# Patient Record
Sex: MALE | Race: WHITE | NOT HISPANIC OR LATINO | URBAN - METROPOLITAN AREA
[De-identification: names, ages, dates, MRNs, and addresses within clinical notes are randomized per-mention and may not be internally consistent; named-entity substitution may affect disease eponyms.]

---

## 2018-03-06 ENCOUNTER — TELEPHONE (OUTPATIENT)
Dept: PRIMARY CARE | Facility: CLINIC | Age: 48
End: 2018-03-06

## 2018-03-06 RX ORDER — AZITHROMYCIN 250 MG/1
TABLET, FILM COATED ORAL
Qty: 6 TABLET | Refills: 0 | Status: SHIPPED | OUTPATIENT
Start: 2018-03-06 | End: 2019-02-13 | Stop reason: ALTCHOICE

## 2018-03-29 ENCOUNTER — TELEPHONE (OUTPATIENT)
Dept: PRIMARY CARE | Facility: CLINIC | Age: 48
End: 2018-03-29

## 2018-03-29 RX ORDER — METHYLPREDNISOLONE 4 MG/1
TABLET ORAL
Qty: 21 TABLET | Refills: 0 | Status: SHIPPED | OUTPATIENT
Start: 2018-03-29 | End: 2018-11-27 | Stop reason: SDUPTHER

## 2018-09-27 ENCOUNTER — TELEPHONE (OUTPATIENT)
Dept: PRIMARY CARE | Facility: CLINIC | Age: 48
End: 2018-09-27

## 2018-09-27 RX ORDER — METHYLPREDNISOLONE 4 MG/1
TABLET ORAL
Qty: 21 TABLET | Refills: 0 | Status: SHIPPED | OUTPATIENT
Start: 2018-09-27 | End: 2019-01-28 | Stop reason: SDUPTHER

## 2018-10-31 ENCOUNTER — OFFICE VISIT (OUTPATIENT)
Dept: PRIMARY CARE | Facility: CLINIC | Age: 48
End: 2018-10-31
Payer: COMMERCIAL

## 2018-10-31 VITALS
HEART RATE: 96 BPM | OXYGEN SATURATION: 98 % | BODY MASS INDEX: 32.9 KG/M2 | DIASTOLIC BLOOD PRESSURE: 90 MMHG | WEIGHT: 235 LBS | HEIGHT: 71 IN | SYSTOLIC BLOOD PRESSURE: 132 MMHG | RESPIRATION RATE: 18 BRPM

## 2018-10-31 DIAGNOSIS — R25.2 CALF CRAMP: ICD-10-CM

## 2018-10-31 DIAGNOSIS — M1A.09X0 IDIOPATHIC CHRONIC GOUT OF MULTIPLE SITES WITHOUT TOPHUS: ICD-10-CM

## 2018-10-31 DIAGNOSIS — H10.33 ACUTE BACTERIAL CONJUNCTIVITIS OF BOTH EYES: Primary | ICD-10-CM

## 2018-10-31 PROCEDURE — 99214 OFFICE O/P EST MOD 30 MIN: CPT | Performed by: FAMILY MEDICINE

## 2018-10-31 ASSESSMENT — ENCOUNTER SYMPTOMS
EYE PAIN: 1
TROUBLE SWALLOWING: 0
NUMBNESS: 1
NECK PAIN: 0
WEAKNESS: 0
NERVOUS/ANXIOUS: 0
BLOOD IN STOOL: 0
HEADACHES: 0
FREQUENCY: 0
EYE DISCHARGE: 1
EYE REDNESS: 1
TREMORS: 1
EYE ITCHING: 1
NAUSEA: 0
SHORTNESS OF BREATH: 0
ARTHRALGIAS: 0
CHEST TIGHTNESS: 0
ABDOMINAL PAIN: 0
DYSURIA: 0
BACK PAIN: 0
FATIGUE: 0
ACTIVITY CHANGE: 0
DIARRHEA: 0
PALPITATIONS: 0

## 2018-10-31 NOTE — PROGRESS NOTES
Daily Progress Note      Subjective      Patient ID: Parvez Salguero is a 48 y.o. male.    HPI  Sudden onset b/l red, irritated, weepy eyes  Itching, painful  No vis affect, uri c/o  Prodrome negative  Also c/o intermittent, varying leg cramps, stiffness, spasms, nonspecific, random    The following have been reviewed and updated as appropriate in this visit:       Review of Systems   Constitutional: Negative for activity change and fatigue.   HENT: Negative for congestion, ear pain, tinnitus and trouble swallowing.    Eyes: Positive for pain, discharge, redness and itching.   Respiratory: Negative for chest tightness and shortness of breath.    Cardiovascular: Negative for chest pain and palpitations.   Gastrointestinal: Negative for abdominal pain, blood in stool, diarrhea and nausea.   Genitourinary: Negative for dysuria, frequency and urgency.   Musculoskeletal: Negative for arthralgias, back pain and neck pain.   Neurological: Positive for tremors and numbness. Negative for weakness and headaches.   Psychiatric/Behavioral: The patient is not nervous/anxious.    All other systems reviewed and are negative.      Current Outpatient Prescriptions   Medication Sig Dispense Refill   • gentamicin-prednisolone (PRED-G) 0.3-1 % ophthalmic drops Administer 1 drop into both eyes 2 (two) times a day for 10 days. 5 mL 0     No current facility-administered medications for this visit.      No past medical history on file.  No family history on file.  No past surgical history on file.  Social History     Social History   • Marital status:      Spouse name: N/A   • Number of children: N/A   • Years of education: N/A     Occupational History   • Not on file.     Social History Main Topics   • Smoking status: Not on file   • Smokeless tobacco: Not on file   • Alcohol use Not on file   • Drug use: Unknown   • Sexual activity: Not on file     Other Topics Concern   • Not on file     Social History Narrative   • No narrative  on file     Allergies   Allergen Reactions   • No Known Allergies        Objective   No new labs.    Physical Exam   Eyes: EOM are normal. Pupils are equal, round, and reactive to light. Right eye exhibits chemosis and discharge. Left eye exhibits chemosis and discharge. Right conjunctiva is injected. Left conjunctiva is injected.   Neurological: He has normal strength. No cranial nerve deficit or sensory deficit. He displays a negative Romberg sign.       Assessment/Plan     Problem List Items Addressed This Visit     Idiopathic chronic gout of multiple sites without tophus    Relevant Orders    Comprehensive metabolic panel    CBC and differential    TSH    Uric acid    Lipid panel      Other Visit Diagnoses     Acute bacterial conjunctivitis of both eyes    -  Primary    Relevant Medications    gentamicin-prednisolone (PRED-G) 0.3-1 % ophthalmic drops    Calf cramp        Relevant Orders    Comprehensive metabolic panel    CBC and differential    TSH    Uric acid    Lipid panel    Lyme EIA reflex WB        Orders Placed This Encounter   Procedures   • Comprehensive metabolic panel     Standing Status:   Future     Number of Occurrences:   1     Standing Expiration Date:   10/31/2019   • CBC and differential     Standing Status:   Future     Number of Occurrences:   1     Standing Expiration Date:   10/31/2019   • TSH     Standing Status:   Future     Number of Occurrences:   1     Standing Expiration Date:   10/31/2019   • Uric acid     Standing Status:   Future     Number of Occurrences:   1     Standing Expiration Date:   10/31/2019   • Lipid panel     Standing Status:   Future     Number of Occurrences:   1     Standing Expiration Date:   10/31/2019   • Lyme EIA reflex WB     Standing Status:   Future     Number of Occurrences:   1     Standing Expiration Date:   10/31/2019     Will trial eye drops x 2-3 d  Re-check if no change  Suspect ncs  Check labs   Discussed/counseled  Suspect ncs    Christos Amin,  DO  10/31/2018

## 2018-11-27 RX ORDER — METHYLPREDNISOLONE 4 MG/1
TABLET ORAL
Qty: 21 TABLET | Refills: 0 | Status: SHIPPED | OUTPATIENT
Start: 2018-11-27 | End: 2019-02-13 | Stop reason: ALTCHOICE

## 2019-01-28 RX ORDER — METHYLPREDNISOLONE 4 MG/1
TABLET ORAL
Qty: 21 TABLET | Refills: 0 | Status: SHIPPED | OUTPATIENT
Start: 2019-01-28 | End: 2019-02-13 | Stop reason: ALTCHOICE

## 2019-02-13 ENCOUNTER — OFFICE VISIT (OUTPATIENT)
Dept: PRIMARY CARE | Facility: CLINIC | Age: 49
End: 2019-02-13
Payer: COMMERCIAL

## 2019-02-13 VITALS
DIASTOLIC BLOOD PRESSURE: 88 MMHG | OXYGEN SATURATION: 98 % | HEART RATE: 92 BPM | HEIGHT: 71 IN | BODY MASS INDEX: 32.48 KG/M2 | RESPIRATION RATE: 18 BRPM | SYSTOLIC BLOOD PRESSURE: 124 MMHG | WEIGHT: 232 LBS

## 2019-02-13 DIAGNOSIS — J01.00 SUBACUTE MAXILLARY SINUSITIS: Primary | ICD-10-CM

## 2019-02-13 DIAGNOSIS — M10.471: ICD-10-CM

## 2019-02-13 PROCEDURE — 99213 OFFICE O/P EST LOW 20 MIN: CPT | Performed by: FAMILY MEDICINE

## 2019-02-13 RX ORDER — METHYLPREDNISOLONE 4 MG/1
TABLET ORAL
Qty: 21 TABLET | Refills: 0 | Status: SHIPPED | OUTPATIENT
Start: 2019-02-13 | End: 2019-03-19 | Stop reason: ALTCHOICE

## 2019-02-13 RX ORDER — IBUPROFEN 200 MG
200 TABLET ORAL EVERY 6 HOURS PRN
COMMUNITY
End: 2019-02-13 | Stop reason: ALTCHOICE

## 2019-02-13 RX ORDER — AZITHROMYCIN 250 MG/1
TABLET, FILM COATED ORAL
Qty: 6 TABLET | Refills: 0 | Status: SHIPPED | OUTPATIENT
Start: 2019-02-13 | End: 2019-03-19 | Stop reason: ALTCHOICE

## 2019-02-13 RX ORDER — INDOMETHACIN 25 MG/1
25 CAPSULE ORAL 2 TIMES DAILY WITH MEALS
COMMUNITY
End: 2019-02-13 | Stop reason: ALTCHOICE

## 2019-02-13 ASSESSMENT — ENCOUNTER SYMPTOMS
DIARRHEA: 0
ACTIVITY CHANGE: 0
FATIGUE: 0
NAUSEA: 0
NERVOUS/ANXIOUS: 0
WEAKNESS: 0
BLOOD IN STOOL: 0
HEADACHES: 0
SHORTNESS OF BREATH: 0
RHINORRHEA: 1
COUGH: 1
EYE PAIN: 0
CHEST TIGHTNESS: 0
ABDOMINAL PAIN: 0
DYSURIA: 0
SINUS PRESSURE: 1
PALPITATIONS: 0
NECK PAIN: 0
SINUS PAIN: 1
BACK PAIN: 0
ARTHRALGIAS: 1
FREQUENCY: 0
TROUBLE SWALLOWING: 0
SORE THROAT: 1

## 2019-02-14 NOTE — PROGRESS NOTES
Daily Progress Note      Subjective      Patient ID: Parvez Salguero is a 49 y.o. male.    HPI  Cough, fever, weakness, congestion, pnd, dsypnea  Ongoing x 5-7 d  No change otc  Fatigue, achey, tired    The following have been reviewed and updated as appropriate in this visit:       Review of Systems   Constitutional: Negative for activity change and fatigue.   HENT: Positive for congestion, ear pain, postnasal drip, rhinorrhea, sinus pain, sinus pressure and sore throat. Negative for tinnitus and trouble swallowing.    Eyes: Negative for pain.   Respiratory: Positive for cough. Negative for chest tightness and shortness of breath.    Cardiovascular: Negative for chest pain and palpitations.   Gastrointestinal: Negative for abdominal pain, blood in stool, diarrhea and nausea.   Genitourinary: Negative for dysuria, frequency and urgency.   Musculoskeletal: Positive for arthralgias and gait problem. Negative for back pain and neck pain.   Neurological: Negative for weakness and headaches.   Psychiatric/Behavioral: The patient is not nervous/anxious.    All other systems reviewed and are negative.      Current Outpatient Prescriptions   Medication Sig Dispense Refill   • azithromycin (ZITHROMAX Z-EVANGELINA) 250 mg tablet 500mg once for 1 day, then 250mg once daily for 4 days 6 tablet 0   • methylPREDNISolone (MEDROL DOSEPACK) 4 mg tablet Follow package directions. 21 tablet 0     No current facility-administered medications for this visit.      No past medical history on file.  No family history on file.  No past surgical history on file.  Social History     Social History   • Marital status:      Spouse name: N/A   • Number of children: N/A   • Years of education: N/A     Occupational History   • Not on file.     Social History Main Topics   • Smoking status: Not on file   • Smokeless tobacco: Not on file   • Alcohol use Not on file   • Drug use: Unknown   • Sexual activity: Not on file     Other Topics Concern   •  Not on file     Social History Narrative   • No narrative on file     Allergies   Allergen Reactions   • No Known Allergies        Objective   No new labs.    Physical Exam   HENT:   Right Ear: Tympanic membrane and ear canal normal.   Left Ear: Tympanic membrane and ear canal normal.   Nose: Mucosal edema present. Right sinus exhibits maxillary sinus tenderness and frontal sinus tenderness. Left sinus exhibits maxillary sinus tenderness and frontal sinus tenderness.   Mouth/Throat: Posterior oropharyngeal edema and posterior oropharyngeal erythema present.   Pulmonary/Chest: Effort normal and breath sounds normal.       Assessment/Plan     Problem List Items Addressed This Visit     None      Visit Diagnoses     Subacute maxillary sinusitis    -  Primary    Other secondary gout of right foot, unspecified chronicity            No orders of the defined types were placed in this encounter.    Will medrol  Will uri protocol  Will zpack  Re-check 2-4 d  Will check labs for gout f/u, etc    Christos Amin, DO  2/13/2019

## 2019-03-15 ENCOUNTER — TELEPHONE (OUTPATIENT)
Dept: PRIMARY CARE | Facility: CLINIC | Age: 49
End: 2019-03-15

## 2019-03-15 DIAGNOSIS — M1A.09X0 IDIOPATHIC CHRONIC GOUT OF MULTIPLE SITES WITHOUT TOPHUS: Primary | ICD-10-CM

## 2019-03-15 DIAGNOSIS — Z00.00 ROUTINE ADULT HEALTH MAINTENANCE: ICD-10-CM

## 2019-03-19 DIAGNOSIS — E78.5 HYPERLIPIDEMIA, UNSPECIFIED HYPERLIPIDEMIA TYPE: Primary | ICD-10-CM

## 2019-03-19 DIAGNOSIS — E78.2 MIXED HYPERLIPIDEMIA: Primary | ICD-10-CM

## 2019-03-19 LAB
ALBUMIN SERPL-MCNC: 4.1 G/DL (ref 3.5–5.5)
ALBUMIN/GLOB SERPL: 2 {RATIO} (ref 1.2–2.2)
ALP SERPL-CCNC: 80 IU/L (ref 39–117)
ALT SERPL-CCNC: 27 IU/L (ref 0–44)
AST SERPL-CCNC: 18 IU/L (ref 0–40)
BASOPHILS # BLD AUTO: 0 X10E3/UL (ref 0–0.2)
BASOPHILS NFR BLD AUTO: 1 %
BILIRUB SERPL-MCNC: 0.5 MG/DL (ref 0–1.2)
BUN SERPL-MCNC: 14 MG/DL (ref 6–24)
BUN/CREAT SERPL: 11 (ref 9–20)
CALCIUM SERPL-MCNC: 9.2 MG/DL (ref 8.7–10.2)
CHLORIDE SERPL-SCNC: 104 MMOL/L (ref 96–106)
CHOLEST SERPL-MCNC: 245 MG/DL (ref 100–199)
CO2 SERPL-SCNC: 26 MMOL/L (ref 20–29)
CREAT SERPL-MCNC: 1.25 MG/DL (ref 0.76–1.27)
EOSINOPHIL # BLD AUTO: 0.1 X10E3/UL (ref 0–0.4)
EOSINOPHIL NFR BLD AUTO: 2 %
ERYTHROCYTE [DISTWIDTH] IN BLOOD BY AUTOMATED COUNT: 13.9 % (ref 12.3–15.4)
GLOBULIN SER CALC-MCNC: 2.1 G/DL (ref 1.5–4.5)
GLUCOSE SERPL-MCNC: 93 MG/DL (ref 65–99)
HCT VFR BLD AUTO: 39.6 % (ref 37.5–51)
HDLC SERPL-MCNC: 36 MG/DL
HGB BLD-MCNC: 13.7 G/DL (ref 13–17.7)
IMM GRANULOCYTES # BLD AUTO: 0 X10E3/UL (ref 0–0.1)
IMM GRANULOCYTES NFR BLD AUTO: 0 %
LAB CORP EGFR IF AFRICN AM: 77 ML/MIN/1.73
LAB CORP EGFR IF NONAFRICN AM: 66 ML/MIN/1.73
LDLC SERPL CALC-MCNC: 178 MG/DL (ref 0–99)
LYMPHOCYTES # BLD AUTO: 1.4 X10E3/UL (ref 0.7–3.1)
LYMPHOCYTES NFR BLD AUTO: 24 %
MCH RBC QN AUTO: 30.8 PG (ref 26.6–33)
MCHC RBC AUTO-ENTMCNC: 34.6 G/DL (ref 31.5–35.7)
MCV RBC AUTO: 89 FL (ref 79–97)
MONOCYTES # BLD AUTO: 0.4 X10E3/UL (ref 0.1–0.9)
MONOCYTES NFR BLD AUTO: 7 %
NEUTROPHILS # BLD AUTO: 3.9 X10E3/UL (ref 1.4–7)
NEUTROPHILS NFR BLD AUTO: 66 %
PLATELET # BLD AUTO: 226 X10E3/UL (ref 150–379)
POTASSIUM SERPL-SCNC: 4.3 MMOL/L (ref 3.5–5.2)
PROT SERPL-MCNC: 6.2 G/DL (ref 6–8.5)
PSA SERPL-MCNC: 2.5 NG/ML (ref 0–4)
RBC # BLD AUTO: 4.45 X10E6/UL (ref 4.14–5.8)
SODIUM SERPL-SCNC: 143 MMOL/L (ref 134–144)
SPECIMEN STATUS: NORMAL
TESTOST SERPL-MCNC: 331 NG/DL (ref 264–916)
TRIGL SERPL-MCNC: 155 MG/DL (ref 0–149)
URATE SERPL-MCNC: 8.4 MG/DL (ref 3.7–8.6)
VLDLC SERPL CALC-MCNC: 31 MG/DL (ref 5–40)
WBC # BLD AUTO: 5.9 X10E3/UL (ref 3.4–10.8)

## 2019-03-19 RX ORDER — ROSUVASTATIN CALCIUM 10 MG/1
10 TABLET, COATED ORAL DAILY
Qty: 30 TABLET | Refills: 2 | Status: SHIPPED | OUTPATIENT
Start: 2019-03-19 | End: 2020-02-19

## 2019-04-25 ENCOUNTER — TELEPHONE (OUTPATIENT)
Dept: PRIMARY CARE | Facility: CLINIC | Age: 49
End: 2019-04-25

## 2019-04-25 DIAGNOSIS — M10.9 GOUT, UNSPECIFIED CAUSE, UNSPECIFIED CHRONICITY, UNSPECIFIED SITE: Primary | ICD-10-CM

## 2019-04-25 NOTE — TELEPHONE ENCOUNTER
Pt is at [email or web address] now, wants uric acid levels, wants  Dr. kumar order this. Fax [phone]  pts # [phone]

## 2019-04-25 NOTE — TELEPHONE ENCOUNTER
Pt at [facility] now to get repeat lipids, pt wants uric acid re-checked as well, was normal on 3/18/19. Do you want to order?

## 2019-04-26 LAB
CHOLEST SERPL-MCNC: 231 MG/DL (ref 100–199)
HDLC SERPL-MCNC: 33 MG/DL
LDLC SERPL CALC-MCNC: 149 MG/DL (ref 0–99)
TRIGL SERPL-MCNC: 246 MG/DL (ref 0–149)
URATE SERPL-MCNC: 8.2 MG/DL (ref 3.7–8.6)
VLDLC SERPL CALC-MCNC: 49 MG/DL (ref 5–40)

## 2019-09-25 RX ORDER — AZITHROMYCIN 250 MG/1
TABLET, FILM COATED ORAL
Qty: 6 TABLET | Refills: 0 | Status: SHIPPED | OUTPATIENT
Start: 2019-09-25 | End: 2019-09-30

## 2020-02-19 ENCOUNTER — HOSPITAL ENCOUNTER (OUTPATIENT)
Facility: CLINIC | Age: 50
Discharge: HOME | End: 2020-02-19
Attending: INTERNAL MEDICINE
Payer: COMMERCIAL

## 2020-02-19 VITALS
SYSTOLIC BLOOD PRESSURE: 124 MMHG | OXYGEN SATURATION: 98 % | TEMPERATURE: 97.9 F | BODY MASS INDEX: 30.1 KG/M2 | HEIGHT: 71 IN | WEIGHT: 215 LBS | HEART RATE: 84 BPM | DIASTOLIC BLOOD PRESSURE: 80 MMHG

## 2020-02-19 DIAGNOSIS — M25.562 ACUTE PAIN OF LEFT KNEE: Primary | ICD-10-CM

## 2020-02-19 PROCEDURE — S9083 URGENT CARE CENTER GLOBAL: HCPCS | Performed by: NURSE PRACTITIONER

## 2020-02-19 PROCEDURE — 99202 OFFICE O/P NEW SF 15 MIN: CPT | Performed by: NURSE PRACTITIONER

## 2020-02-19 RX ORDER — IBUPROFEN 600 MG/1
600 TABLET ORAL 3 TIMES DAILY PRN
Qty: 30 TABLET | Refills: 0 | Status: SHIPPED | OUTPATIENT
Start: 2020-02-19 | End: 2022-12-02 | Stop reason: ALTCHOICE

## 2020-02-19 RX ORDER — METHYLPREDNISOLONE 4 MG/1
TABLET ORAL
Qty: 21 TABLET | Refills: 0 | Status: SHIPPED | OUTPATIENT
Start: 2020-02-19 | End: 2022-12-02 | Stop reason: ALTCHOICE

## 2020-02-19 ASSESSMENT — ENCOUNTER SYMPTOMS
COUGH: 0
ARTHRALGIAS: 0
PALPITATIONS: 0
HEMATURIA: 0
ABDOMINAL PAIN: 0
EYE PAIN: 0
SORE THROAT: 0
CHILLS: 0
VOMITING: 0
BACK PAIN: 0
COLOR CHANGE: 0
FEVER: 0
SHORTNESS OF BREATH: 0
SEIZURES: 0
DYSURIA: 0

## 2020-02-19 NOTE — ED PROVIDER NOTES
HPI     Chief Complaint   Patient presents with   • left leg pain (Mon)   • pain/swelling around knee       Pt present for left leg pain x 4 days. Pt reports that he was working out with the elliptical on Saturday. Pt woke up the next day with aching knee.Pt c/o worsening pain, swelling, and shooting pain on lateral thigh toward achilles. Pt has tried ice and Aleve. Denies paresthesia's or motor weakness.      History provided by:  Patient   used: No         Patient History     History reviewed. No pertinent past medical history.    History reviewed. No pertinent surgical history.    Family History   Problem Relation Age of Onset   • Diabetes Biological Father        Social History     Tobacco Use   • Smoking status: Never Smoker   • Smokeless tobacco: Never Used   Substance Use Topics   • Alcohol use: Not on file   • Drug use: Not on file       Systems Reviewed from Nursing Triage:  Allergies  Meds  Problems  Med Hx  Surg Hx  Fam Hx          Review of Systems     Review of Systems   Constitutional: Negative for chills and fever.   HENT: Negative for ear pain and sore throat.    Eyes: Negative for pain and visual disturbance.   Respiratory: Negative for cough and shortness of breath.    Cardiovascular: Negative for chest pain and palpitations.   Gastrointestinal: Negative for abdominal pain and vomiting.   Genitourinary: Negative for dysuria and hematuria.   Musculoskeletal: Negative for arthralgias and back pain.        Left thigh, knee pain and swelling   Skin: Negative for color change and rash.   Neurological: Negative for seizures and syncope.   All other systems reviewed and are negative.       Physical Exam     ED Triage Vitals [02/19/20 1812]   Temp Heart Rate Resp BP SpO2   36.6 °C (97.9 °F) 84 -- 124/80 98 %      Temp Source Heart Rate Source Patient Position BP Location FiO2 (%) (Set)   Oral -- Sitting Left upper arm --                     Patient Vitals for the past 24 hrs:   BP  "Temp Temp src Pulse SpO2 Height Weight   02/19/20 1812 124/80 36.6 °C (97.9 °F) Oral 84 98 % 1.803 m (5' 11\") 97.5 kg (215 lb)                                       Physical Exam   Constitutional: He appears well-developed and well-nourished.   HENT:   Head: Normocephalic and atraumatic.   Eyes: Conjunctivae are normal.   Neck: Neck supple.   Cardiovascular: Normal rate, regular rhythm and normal heart sounds.   No murmur heard.  Pulmonary/Chest: Effort normal and breath sounds normal. No respiratory distress.   Abdominal: Soft. There is no tenderness.   Musculoskeletal: He exhibits no edema.        Left knee: He exhibits decreased range of motion and swelling.        Legs:  Left lateral knee pain on deep palpation, edema and warmth present over left knee. Distal pulses and sensation intact. Motor strength 5/5, ROM decreased.   Neurological: He is alert.   Skin: Skin is warm and dry.   Psychiatric: He has a normal mood and affect.   Nursing note and vitals reviewed.           Procedures    ED Course & MDM     Labs Reviewed - No data to display    No orders to display               MDM  Number of Diagnoses or Management Options  Acute pain of left knee:   Diagnosis management comments: Left lateral knee pain vs IT band syndrome, discussed with pt. Prescribed Medrol dose pack and ibuprofen.   Advised pt to apply ice or heat for 20 minutes to decrease inflammation.  Rest and elevated leg to decrease swelling.  Perform stretching exercises.  Follow up with orthopedic specialist. KENDELL Bello card provided.           Clinical Impressions as of Feb 19 1924   Acute pain of left knee        Tash Portillo CRNP  02/19/20 1940    "

## 2020-02-20 ENCOUNTER — TELEPHONE (OUTPATIENT)
Dept: URGENT CARE | Facility: CLINIC | Age: 50
End: 2020-02-20

## 2020-02-20 NOTE — DISCHARGE INSTRUCTIONS
Take medrol dose pack as prescribed.  Take ibuprofen 3x day as needed for pain.  Apply ice or heat for 20 minutes to decrease inflammation.  Rest and elevated leg to decrease swelling.  Perform stretching exercises.  Follow up with orthopedic specialist.

## 2020-02-20 NOTE — ED ATTESTATION NOTE
I was immediately available to provide supervision and direction for the care of the patient.     Lizandro Lemus,   02/20/20 0202

## 2020-08-25 ENCOUNTER — TELEPHONE (OUTPATIENT)
Dept: PRIMARY CARE | Facility: CLINIC | Age: 50
End: 2020-08-25

## 2020-08-25 NOTE — TELEPHONE ENCOUNTER
Patient spoke to . Dr Amin told him to make an appt for a sleep study script. He is at my desk can I put him in today. ty

## 2020-08-27 ENCOUNTER — TELEMEDICINE (OUTPATIENT)
Dept: PRIMARY CARE | Facility: CLINIC | Age: 50
End: 2020-08-27
Payer: COMMERCIAL

## 2020-08-27 DIAGNOSIS — G47.33 OSA (OBSTRUCTIVE SLEEP APNEA): Primary | ICD-10-CM

## 2020-08-27 PROCEDURE — 99213 OFFICE O/P EST LOW 20 MIN: CPT | Mod: 95 | Performed by: FAMILY MEDICINE

## 2020-08-27 RX ORDER — ALLOPURINOL 100 MG/1
100 TABLET ORAL
COMMUNITY
Start: 2020-08-10 | End: 2022-12-02 | Stop reason: ALTCHOICE

## 2020-08-27 ASSESSMENT — ENCOUNTER SYMPTOMS
WEAKNESS: 1
UNEXPECTED WEIGHT CHANGE: 1
APNEA: 1

## 2020-08-27 NOTE — PROGRESS NOTES
Verification of Patient Location:  The patient affirms they are currently located in the following state: [place]    Request for Consent:    Video Encounter   Hello, my name is Christos ANDRADE DO Brennan.  Before we proceed, can you please verify your identification by telling me your full name and date of birth?  Can you tell me who is in the room with you?    You and I are about to have a telemedicine check-in or visit because you have requested it.  This is a live video-conference.  I am a real person, speaking to you in real time.  There is no one else with me on the video-conference.  However, when we use ([employer], [employer], etc) it is important for you to know that the video-conference may not be secure or private.  I am not recording this conversation and I am asking you not to record it.  This telemedicine visit will be billed to your health insurance or you, if you are self-insured.  You understand you will be responsible for any copayments or coinsurances that apply to your telemedicine visit.  Communication platform used for this encounter:  Non-integrated Zoom     Before starting our telemedicine visit, I am required to get your consent for this virtual check-in or visit by telemedicine. Do you consent?      Patient Response to Request for Consent:  Yes      Visit Documentation:  Subjective     Patient ID: Parvez Salguero is a 50 y.o. male.  1970      Snores, has witnessed apnea  Thick neck, crowded oropharynx      The following have been reviewed and updated as appropriate in this visit:       Review of Systems   Constitutional: Positive for unexpected weight change.   Respiratory: Positive for apnea.    Neurological: Positive for weakness.         Assessment/Plan   Diagnoses and all orders for this visit:    JOSSUE (obstructive sleep apnea) (Primary)  -     Home sleep test; Future      Discussed at length  reviewed jossue, cpap  Will home sleep study  Reviewed    Time Spent in Medical Discussion During This  Encounter:    Total encounter time, with >50 percent spent counseling/coordinatin minutes

## 2020-09-08 ENCOUNTER — HOSPITAL ENCOUNTER (OUTPATIENT)
Dept: SLEEP MEDICINE | Facility: HOSPITAL | Age: 50
Discharge: HOME | End: 2020-09-08
Attending: FAMILY MEDICINE
Payer: COMMERCIAL

## 2020-09-08 DIAGNOSIS — G47.33 OSA (OBSTRUCTIVE SLEEP APNEA): ICD-10-CM

## 2020-09-08 PROCEDURE — G0399 HOME SLEEP TEST/TYPE 3 PORTA: HCPCS

## 2022-11-17 ENCOUNTER — TELEPHONE (OUTPATIENT)
Dept: PRIMARY CARE | Facility: CLINIC | Age: 52
End: 2022-11-17

## 2022-11-17 NOTE — TELEPHONE ENCOUNTER
[facility] Appointment Request   Provider:   Appointment Type: OV   Reason for Visit: Gout flare on right foot   Available Day and Time: asap   Best Contact Number: [phone]    The practice will reach out to schedule your appointment within the next 2 business days.

## 2022-11-18 ENCOUNTER — TELEPHONE (OUTPATIENT)
Dept: PRIMARY CARE | Facility: CLINIC | Age: 52
End: 2022-11-18
Payer: COMMERCIAL

## 2022-11-18 ENCOUNTER — TELEPHONE (OUTPATIENT)
Dept: PRIMARY CARE | Facility: CLINIC | Age: 52
End: 2022-11-18

## 2022-11-18 RX ORDER — PREDNISONE 10 MG/1
10 TABLET ORAL 4 TIMES DAILY
Qty: 16 TABLET | Refills: 0 | Status: SHIPPED | OUTPATIENT
Start: 2022-11-18 | End: 2022-12-02 | Stop reason: ALTCHOICE

## 2022-11-18 NOTE — TELEPHONE ENCOUNTER
Spoke w/ pt. States that since last weekend he began to have a gout flare-up in right ankle and foot. Swelling. Has been icing with no relief. Aleve helps for short amount of time. Had had 4 flare ups since this summer. Tried to schedule w/ Rheum, but cannot get in until 2/23. Has taken Colchicine in the past, which made symptoms worse. Pt would like to potentially start Allopurinol. Pt is extremely uncomfortable.

## 2022-11-18 NOTE — TELEPHONE ENCOUNTER
Patient called to follow up on  Request for appointment gout flare up in right foot over the past 6 days patient would like to know if he could be scheduled for a telemed appointment due to having a lot of discomfort in foot please contact and advise.

## 2022-11-18 NOTE — TELEPHONE ENCOUNTER
[facility] Appointment Request   Provider: Dr. Amin only  Appointment Type: OV  Reason for Visit: Gout   Available Day and Time: Any time before holiday    Best Contact Number: [phone]      The practice will reach out to schedule your appointment within the next 2 business days.

## 2022-11-21 ENCOUNTER — TELEPHONE (OUTPATIENT)
Dept: PRIMARY CARE | Facility: CLINIC | Age: 52
End: 2022-11-21
Payer: COMMERCIAL

## 2022-11-21 DIAGNOSIS — M10.9 GOUT, UNSPECIFIED CAUSE, UNSPECIFIED CHRONICITY, UNSPECIFIED SITE: Primary | ICD-10-CM

## 2022-11-24 LAB — URATE SERPL-MCNC: 8.4 MG/DL (ref 3.8–8.4)

## 2022-11-25 NOTE — TELEPHONE ENCOUNTER
[facility] Appointment Request   Provider: Harrison Diego  Appointment Type: same day  Reason for Visit: gout flare up, pt is out of meds and is back on crutches for for gout, if pt can not be seen 11-25 could meds please be called in  Available Day and Time: 11-25  Best Contact Number: [phone]    The practice will reach out to schedule your appointment within the next 2 business days.

## 2022-11-29 ENCOUNTER — TELEPHONE (OUTPATIENT)
Dept: PRIMARY CARE | Facility: CLINIC | Age: 52
End: 2022-11-29
Payer: COMMERCIAL

## 2022-11-29 RX ORDER — ALLOPURINOL 300 MG/1
300 TABLET ORAL DAILY
Qty: 90 TABLET | Refills: 1 | Status: SHIPPED | OUTPATIENT
Start: 2022-11-29 | End: 2023-02-01 | Stop reason: SINTOL

## 2022-11-29 RX ORDER — METHYLPREDNISOLONE 4 MG/1
TABLET ORAL
Qty: 21 TABLET | Refills: 0 | Status: SHIPPED | OUTPATIENT
Start: 2022-11-29 | End: 2022-12-02 | Stop reason: ALTCHOICE

## 2022-11-29 NOTE — TELEPHONE ENCOUNTER
Pt is scheduled for 12/2.   Pt does not have any allopurinol 100mg on hand.  Please send to [facility]. [address]

## 2022-11-29 NOTE — TELEPHONE ENCOUNTER
Medication Request   Patient PCP: Christos Amin, DO  Next Office Visit: Visit date not found  Has this provider prescribed this medication before?:   Medication Name: allopurinol  Medication Dose: 100mg  Medication Frequency:   Preferred Pharmacy:   [id number] - [place] - [address] [facility] [address]  [address] [facility] [place] [address]  Phone: [phone] Fax: [phone]    [facility]pharmacy #[id number] - [place] - [address]  [address]  [facility] [address]  Phone: [phone] Fax: [phone]    Pt needs this medication ordered. He does not have it and was told to take this for his gout. Pharmacy verified  Please allow 2 business days for your provider to send your medication request or to reach out to discuss.

## 2022-12-02 ENCOUNTER — OFFICE VISIT (OUTPATIENT)
Dept: PRIMARY CARE | Facility: CLINIC | Age: 52
End: 2022-12-02
Payer: COMMERCIAL

## 2022-12-02 VITALS
WEIGHT: 235 LBS | HEART RATE: 90 BPM | OXYGEN SATURATION: 99 % | HEIGHT: 71 IN | TEMPERATURE: 97.4 F | BODY MASS INDEX: 32.9 KG/M2 | RESPIRATION RATE: 16 BRPM | SYSTOLIC BLOOD PRESSURE: 130 MMHG | DIASTOLIC BLOOD PRESSURE: 72 MMHG

## 2022-12-02 DIAGNOSIS — Z00.00 ROUTINE ADULT HEALTH MAINTENANCE: ICD-10-CM

## 2022-12-02 DIAGNOSIS — M1A.09X0 IDIOPATHIC CHRONIC GOUT OF MULTIPLE SITES WITHOUT TOPHUS: Primary | ICD-10-CM

## 2022-12-02 DIAGNOSIS — E78.2 MIXED HYPERLIPIDEMIA: ICD-10-CM

## 2022-12-02 DIAGNOSIS — E78.5 HYPERLIPIDEMIA, UNSPECIFIED HYPERLIPIDEMIA TYPE: ICD-10-CM

## 2022-12-02 PROCEDURE — 99214 OFFICE O/P EST MOD 30 MIN: CPT | Performed by: FAMILY MEDICINE

## 2022-12-02 PROCEDURE — 3008F BODY MASS INDEX DOCD: CPT | Performed by: FAMILY MEDICINE

## 2022-12-02 ASSESSMENT — PATIENT HEALTH QUESTIONNAIRE - PHQ9: SUM OF ALL RESPONSES TO PHQ9 QUESTIONS 1 & 2: 0

## 2022-12-02 NOTE — PROGRESS NOTES
Daily Progress Note      Subjective      Patient ID: Parvez Salguero is a 52 y.o. male.    HPI  For gout discussion, review  Still sore, intermittent, varying, etc  Also, For med check, diagnosis discussion  Doing well on current rx  No new c/o, compliant      The following have been reviewed and updated as appropriate in this visit:   Problems       Review of Systems    Current Outpatient Medications   Medication Sig Dispense Refill   • allopurinoL (ZYLOPRIM) 300 mg tablet Take 1 tablet (300 mg total) by mouth daily. 90 tablet 1     No current facility-administered medications for this visit.     History reviewed. No pertinent past medical history.  Family History   Problem Relation Age of Onset   • Diabetes Biological Father      History reviewed. No pertinent surgical history.  Social History     Socioeconomic History   • Marital status:      Spouse name: Not on file   • Number of children: Not on file   • Years of education: Not on file   • Highest education level: Not on file   Occupational History   • Not on file   Tobacco Use   • Smoking status: Never   • Smokeless tobacco: Never   Substance and Sexual Activity   • Alcohol use: Not on file   • Drug use: Not on file   • Sexual activity: Not on file   Other Topics Concern   • Not on file   Social History Narrative   • Not on file     Social Determinants of Health     Financial Resource Strain: Not on file   Food Insecurity: Not on file   Transportation Needs: Not on file   Physical Activity: Not on file   Stress: Not on file   Social Connections: Not on file   Intimate Partner Violence: Not on file   Housing Stability: Not on file     Allergies   Allergen Reactions   • No Known Allergies        Objective   I have reviewed the patient's pertinent labs. Pertinent labs are within normal limits.    Physical Exam    Assessment/Plan     Problem List Items Addressed This Visit        Other    Idiopathic chronic gout of multiple sites without tophus - Primary     Relevant Orders    CBC and differential    Uric acid    Mixed hyperlipidemia    Relevant Orders    Comprehensive metabolic panel    Lipid panel   Other Visit Diagnoses     Hyperlipidemia, unspecified hyperlipidemia type        Relevant Orders    Comprehensive metabolic panel    Routine adult health maintenance        Relevant Orders    PSA        Orders Placed This Encounter   Procedures   • CBC and differential     Standing Status:   Future     Number of Occurrences:   1     Standing Expiration Date:   12/2/2023     Order Specific Question:   Release to patient     Answer:   Immediate   • Comprehensive metabolic panel     Standing Status:   Future     Number of Occurrences:   1     Standing Expiration Date:   12/2/2023     Order Specific Question:   Release to patient     Answer:   Immediate   • Lipid panel     Standing Status:   Future     Number of Occurrences:   1     Standing Expiration Date:   12/2/2023     Order Specific Question:   Release to patient     Answer:   Immediate   • PSA     Standing Status:   Future     Number of Occurrences:   1     Standing Expiration Date:   12/2/2023     Order Specific Question:   Release to patient     Answer:   Immediate   • Uric acid     Standing Status:   Future     Number of Occurrences:   1     Standing Expiration Date:   12/2/2023     Order Specific Question:   Release to patient     Answer:   Immediate     Reviewed rx, dx, tx, hx, labs  Stable on current  Will fiish steroid, will transition to nsaids prn  Will c/w allopurinol  Diet, ex, safety reviewed, re-check labs x 6 w    Christos Amin, DO  12/2/2022

## 2022-12-09 RX ORDER — INDOMETHACIN 75 MG/1
75 CAPSULE, EXTENDED RELEASE ORAL 2 TIMES DAILY WITH MEALS
Qty: 60 CAPSULE | Refills: 0 | Status: SHIPPED | OUTPATIENT
Start: 2022-12-09 | End: 2023-01-09

## 2022-12-19 RX ORDER — PREDNISONE 10 MG/1
10 TABLET ORAL 4 TIMES DAILY
Qty: 16 TABLET | Refills: 0 | Status: SHIPPED | OUTPATIENT
Start: 2022-12-19 | End: 2023-02-01 | Stop reason: SDUPTHER

## 2022-12-21 ENCOUNTER — TELEPHONE (OUTPATIENT)
Dept: PRIMARY CARE | Facility: CLINIC | Age: 52
End: 2022-12-21
Payer: COMMERCIAL

## 2022-12-21 RX ORDER — COLCHICINE 0.6 MG/1
0.6 TABLET ORAL 2 TIMES DAILY
Qty: 60 TABLET | Refills: 0 | Status: SHIPPED | OUTPATIENT
Start: 2022-12-21 | End: 2023-05-25 | Stop reason: ALTCHOICE

## 2023-01-09 RX ORDER — INDOMETHACIN 75 MG/1
CAPSULE, EXTENDED RELEASE ORAL
Qty: 60 CAPSULE | Refills: 0 | Status: SHIPPED | OUTPATIENT
Start: 2023-01-09 | End: 2023-05-25 | Stop reason: ALTCHOICE

## 2023-01-12 LAB — URATE SERPL-MCNC: 6.2 MG/DL (ref 3.8–8.4)

## 2023-01-30 LAB
BASOPHILS # BLD AUTO: 0 X10E3/UL (ref 0–0.2)
BASOPHILS NFR BLD AUTO: 1 %
EOSINOPHIL # BLD AUTO: 0.1 X10E3/UL (ref 0–0.4)
EOSINOPHIL NFR BLD AUTO: 2 %
ERYTHROCYTE [DISTWIDTH] IN BLOOD BY AUTOMATED COUNT: 13 % (ref 11.6–15.4)
HCT VFR BLD AUTO: 45.1 % (ref 37.5–51)
HGB BLD-MCNC: 15.1 G/DL (ref 13–17.7)
IMM GRANULOCYTES # BLD AUTO: 0 X10E3/UL (ref 0–0.1)
IMM GRANULOCYTES NFR BLD AUTO: 1 %
LYMPHOCYTES # BLD AUTO: 1.3 X10E3/UL (ref 0.7–3.1)
LYMPHOCYTES NFR BLD AUTO: 22 %
MCH RBC QN AUTO: 29.9 PG (ref 26.6–33)
MCHC RBC AUTO-ENTMCNC: 33.5 G/DL (ref 31.5–35.7)
MCV RBC AUTO: 89 FL (ref 79–97)
MONOCYTES # BLD AUTO: 0.7 X10E3/UL (ref 0.1–0.9)
MONOCYTES NFR BLD AUTO: 13 %
NEUTROPHILS # BLD AUTO: 3.5 X10E3/UL (ref 1.4–7)
NEUTROPHILS NFR BLD AUTO: 61 %
PLATELET # BLD AUTO: 182 X10E3/UL (ref 150–450)
RBC # BLD AUTO: 5.05 X10E6/UL (ref 4.14–5.8)
WBC # BLD AUTO: 5.6 X10E3/UL (ref 3.4–10.8)

## 2023-01-31 LAB
ALBUMIN SERPL-MCNC: 4.4 G/DL (ref 3.8–4.9)
ALBUMIN/GLOB SERPL: 2 {RATIO} (ref 1.2–2.2)
ALP SERPL-CCNC: 92 IU/L (ref 44–121)
ALT SERPL-CCNC: 45 IU/L (ref 0–44)
AST SERPL-CCNC: 26 IU/L (ref 0–40)
BILIRUB SERPL-MCNC: 0.6 MG/DL (ref 0–1.2)
BUN SERPL-MCNC: 16 MG/DL (ref 6–24)
BUN/CREAT SERPL: 13 (ref 9–20)
CALCIUM SERPL-MCNC: 9.4 MG/DL (ref 8.7–10.2)
CHLORIDE SERPL-SCNC: 103 MMOL/L (ref 96–106)
CHOLEST SERPL-MCNC: 195 MG/DL (ref 100–199)
CO2 SERPL-SCNC: 26 MMOL/L (ref 20–29)
CREAT SERPL-MCNC: 1.28 MG/DL (ref 0.76–1.27)
EGFRCR SERPLBLD CKD-EPI 2021: 67 ML/MIN/1.73
GLOBULIN SER CALC-MCNC: 2.2 G/DL (ref 1.5–4.5)
GLUCOSE SERPL-MCNC: 88 MG/DL (ref 70–99)
HDLC SERPL-MCNC: 39 MG/DL
LDLC SERPL CALC-MCNC: 126 MG/DL (ref 0–99)
POTASSIUM SERPL-SCNC: 4.9 MMOL/L (ref 3.5–5.2)
PROT SERPL-MCNC: 6.6 G/DL (ref 6–8.5)
PSA SERPL-MCNC: 2.3 NG/ML (ref 0–4)
SODIUM SERPL-SCNC: 143 MMOL/L (ref 134–144)
TRIGL SERPL-MCNC: 168 MG/DL (ref 0–149)
URATE SERPL-MCNC: 6.3 MG/DL (ref 3.8–8.4)
VLDLC SERPL CALC-MCNC: 30 MG/DL (ref 5–40)

## 2023-02-01 ENCOUNTER — OFFICE VISIT (OUTPATIENT)
Dept: PRIMARY CARE | Facility: CLINIC | Age: 53
End: 2023-02-01
Payer: COMMERCIAL

## 2023-02-01 ENCOUNTER — HOSPITAL ENCOUNTER (OUTPATIENT)
Dept: RADIOLOGY | Age: 53
Discharge: HOME | End: 2023-02-01
Attending: NURSE PRACTITIONER
Payer: COMMERCIAL

## 2023-02-01 ENCOUNTER — TELEPHONE (OUTPATIENT)
Dept: PRIMARY CARE | Facility: CLINIC | Age: 53
End: 2023-02-01

## 2023-02-01 VITALS
HEART RATE: 99 BPM | BODY MASS INDEX: 33.01 KG/M2 | DIASTOLIC BLOOD PRESSURE: 84 MMHG | OXYGEN SATURATION: 99 % | SYSTOLIC BLOOD PRESSURE: 150 MMHG | RESPIRATION RATE: 18 BRPM | HEIGHT: 71 IN | TEMPERATURE: 97.2 F | WEIGHT: 235.8 LBS

## 2023-02-01 DIAGNOSIS — R10.32 GROIN PAIN, LEFT: ICD-10-CM

## 2023-02-01 DIAGNOSIS — M10.9 GOUT, UNSPECIFIED CAUSE, UNSPECIFIED CHRONICITY, UNSPECIFIED SITE: Primary | ICD-10-CM

## 2023-02-01 DIAGNOSIS — R22.41 LOCALIZED SWELLING OF RIGHT FOOT: ICD-10-CM

## 2023-02-01 PROCEDURE — 72110 X-RAY EXAM L-2 SPINE 4/>VWS: CPT

## 2023-02-01 PROCEDURE — 3008F BODY MASS INDEX DOCD: CPT | Performed by: NURSE PRACTITIONER

## 2023-02-01 PROCEDURE — 73630 X-RAY EXAM OF FOOT: CPT | Mod: RT

## 2023-02-01 PROCEDURE — 73502 X-RAY EXAM HIP UNI 2-3 VIEWS: CPT | Mod: LT

## 2023-02-01 PROCEDURE — 99214 OFFICE O/P EST MOD 30 MIN: CPT | Performed by: NURSE PRACTITIONER

## 2023-02-01 RX ORDER — PREDNISONE 10 MG/1
10 TABLET ORAL 4 TIMES DAILY
Qty: 16 TABLET | Refills: 0 | Status: SHIPPED | OUTPATIENT
Start: 2023-02-01 | End: 2023-02-10 | Stop reason: ALTCHOICE

## 2023-02-01 NOTE — TELEPHONE ENCOUNTER
Consent to Procedure/Sedation    Date: 12-    Time: 1550    1. I authorize the performance upon Franciscomary Montanez the following:                    Insertion of Triple Lumen Catheter     2.  I authorize Dr. Ton Cotton (and whomever is designated as the doc Request for Medical Advice (NON-URGENT)   Patient PCP: Christos Amin, DO  New or Existing Issue: New and Existing  Question or Concern:  Has been seeing/ talking to Dr. Amin about gout, on his right foot. - for last 3 days drinking only water, swelling is reducing, area is still red. -  would like to get an MRI on his foot. Not sure if he has to be seen for this.   New concern, had an issue in his left groin area, thinking its effecting his sciatica. -  thinks he might an MRI left groin   did not really know.   not sure if he should come in.  Please call Pt back, [phone]  Preferred Pharmacy:   [id number] - [place] - [address]  [address]  [place] [address]  Phone: [phone] Fax: [phone]    [id number]  [place] - [address]  [address]  [place] [address]  Phone: [phone] Fax: [phone]      The practice will reach out to discuss your Medical Question or Concern within 2 business days.    ___________________________    ___________________    Witness: ____________________     Date: ______________    Printed: 2017   3:53 PM    Patient Name: Ashlee Hatch        : 1951       Medical Record #: [id number]

## 2023-02-01 NOTE — TELEPHONE ENCOUNTER
Did not see any information in chart about any discussion regarding gout  Do you want to see pt or schedule with Dr Amin next week???

## 2023-02-01 NOTE — PROGRESS NOTES
"  Subjective     Patient ID: Parvez Salguero is a 52 y.o. male.    HPI  Gout, allopurinol started about 30 days ago, he has been haing rash since that time ? Swelling related? Fasting and increased water with good results x 3 days.     Left groin pain , does not radiate down leg. More with certain movements.     Review of Systems   Musculoskeletal: Positive for arthralgias and myalgias.       Objective     Vitals:    02/01/23 1526   BP: (!) 150/84   BP Location: Right upper arm   Patient Position: Sitting   Pulse: 99   Resp: 18   Temp: 36.2 °C (97.2 °F)   TempSrc: Temporal   SpO2: 99%   Weight: 107 kg (235 lb 12.8 oz)   Height: 1.803 m (5' 11\")     Body mass index is 32.89 kg/m².    Physical Exam  Vitals reviewed.   Constitutional:       Appearance: Normal appearance.   Cardiovascular:      Rate and Rhythm: Normal rate.   Musculoskeletal:         General: Swelling present. Normal range of motion.        Feet:    Feet:      Comments: Redness and mild edema   Skin:     General: Skin is warm and dry.   Neurological:      Mental Status: He is alert and oriented to person, place, and time.      Motor: No weakness.         Assessment/Plan   Diagnoses and all orders for this visit:    Gout, unspecified cause, unspecified chronicity, unspecified site (Primary)    Groin pain, left  -     X-RAY LUMBAR SPINE COMPLETE 4+ VIEWS; Future  -     X-RAY HIP WITH OR WITHOUT PELVIS 2-3 VW LEFT; Future  -     Ambulatory referral to Physical Therapy; Future    Localized swelling of right foot  -     X-RAY FOOT RIGHT 3+ VIEWS; Future    Other orders  -     predniSONE (DELTASONE) 10 mg tablet; Take 1 tablet (10 mg total) by mouth 4 (four) times a day for 4 days.      Discussed and reviewed plan with patient will do prednisone QID x4 days. Will d/c allopurinol, added to allergy list as an intolerance. Will check xrays and start PT for groin pain. All questions and concerns have been addressed. Patient will contact the office if symptoms fail " to improve or worsen.       AN Hatfield

## 2023-02-02 DIAGNOSIS — M16.12 OSTEOARTHRITIS OF LEFT HIP, UNSPECIFIED OSTEOARTHRITIS TYPE: Primary | ICD-10-CM

## 2023-02-02 ASSESSMENT — ENCOUNTER SYMPTOMS
ARTHRALGIAS: 1
MYALGIAS: 1

## 2023-02-03 ENCOUNTER — TELEPHONE (OUTPATIENT)
Dept: PRIMARY CARE | Facility: CLINIC | Age: 53
End: 2023-02-03
Payer: COMMERCIAL

## 2023-02-03 NOTE — TELEPHONE ENCOUNTER
Request for Medical Advice (NON-URGENT)   Patient PCP: Christos Amin, DO  New or Existing Issue: Existing Issue   Question or Concern: pt is calling because he says he had some significant burning and pain in his foot. Patient wanted to let Brandie know.   Preferred Pharmacy:   [id number] - [place] - [address] AT [address]  [phone]    The practice will reach out to discuss your Medical Question or Concern within 2 business days.

## 2023-02-06 NOTE — TELEPHONE ENCOUNTER
Patient called back,   Began a few nights after the allupurinol.   Used calmosepeinc- zinc oxide lotion reduced pain, red and swelling.   He is on his last day of steroid. No weakness noted.  Greatly improved since Friday.     MRI scheduled for Friday 6:15 pm.

## 2023-02-09 NOTE — TELEPHONE ENCOUNTER
Pt calling and would like to come in again for a quick visit. Pt advised he has finished all of Prednisone and swelling went down 70% of right foot but pt is still having area focus pain and redness. Pt wants [facility] to take a look again. Pt has open availability today and tomorrow and is 5 minutes away.    Pt also has f/u info about therapy for groin injury.

## 2023-02-10 ENCOUNTER — OFFICE VISIT (OUTPATIENT)
Dept: PRIMARY CARE | Facility: CLINIC | Age: 53
End: 2023-02-10
Payer: COMMERCIAL

## 2023-02-10 VITALS
RESPIRATION RATE: 18 BRPM | BODY MASS INDEX: 33.04 KG/M2 | TEMPERATURE: 97.7 F | HEIGHT: 71 IN | WEIGHT: 236 LBS | HEART RATE: 94 BPM | DIASTOLIC BLOOD PRESSURE: 74 MMHG | OXYGEN SATURATION: 99 % | SYSTOLIC BLOOD PRESSURE: 130 MMHG

## 2023-02-10 DIAGNOSIS — M79.89 FOOT SWELLING: ICD-10-CM

## 2023-02-10 DIAGNOSIS — M25.471 RIGHT ANKLE SWELLING: Primary | ICD-10-CM

## 2023-02-10 PROCEDURE — 99213 OFFICE O/P EST LOW 20 MIN: CPT | Performed by: NURSE PRACTITIONER

## 2023-02-10 PROCEDURE — 3008F BODY MASS INDEX DOCD: CPT | Performed by: NURSE PRACTITIONER

## 2023-02-10 RX ORDER — METHYLPREDNISOLONE 4 MG/1
TABLET ORAL
Qty: 21 TABLET | Refills: 0 | Status: SHIPPED | OUTPATIENT
Start: 2023-02-10 | End: 2023-02-17

## 2023-02-10 NOTE — PROGRESS NOTES
"  Subjective     Patient ID: Parvez Salguero is a 53 y.o. male.    HPI  Started with PT, some relief with some manuevers.   MRI scheduled tonight  Reoccuring ankle swelling over the past year ,8 times.  Prednisone helps but then returns.    Review of Systems   Cardiovascular: Positive for leg swelling.       Objective     Vitals:    02/10/23 1508   BP: 130/74   BP Location: Right upper arm   Patient Position: Sitting   Pulse: 94   Resp: 18   Temp: 36.5 °C (97.7 °F)   TempSrc: Temporal   SpO2: 99%   Weight: 107 kg (236 lb)   Height: 1.803 m (5' 11\")     Body mass index is 32.92 kg/m².    Physical Exam  Vitals reviewed.   Constitutional:       Appearance: Normal appearance.   Musculoskeletal:         General: Swelling present.        Feet:    Feet:      Right foot:      Skin integrity: Erythema ( mild) present. No warmth.      Comments: Swelling  Skin:     General: Skin is warm and dry.   Neurological:      Mental Status: He is alert and oriented to person, place, and time.         Assessment/Plan   Diagnoses and all orders for this visit:    Right ankle swelling (Primary)  -     Ultrasound ROMY extremity; Future    Foot swelling    Other orders  -     methylPREDNISolone (MEDROL DOSEPACK) 4 mg tablet; Follow package directions.      Discussed and reviewed plan with patient will do Medrol. Will check ABIs. Patient in agreement with plan.    AN Hatfield      "

## 2023-02-10 NOTE — TELEPHONE ENCOUNTER
Pt in a lot of pain. Brandie ok'd adding him to schedule today 9/10 but has no available spots today.  is booked as well. Pt can't wait till Monday. Pt has prednisone refill from previous injury that he can take if he can't come in till Monday. Pt wants to know should he take those.

## 2023-02-13 ENCOUNTER — TELEPHONE (OUTPATIENT)
Dept: PRIMARY CARE | Facility: CLINIC | Age: 53
End: 2023-02-13
Payer: COMMERCIAL

## 2023-02-13 NOTE — TELEPHONE ENCOUNTER
Precert was submitted and approved.     Authorization  [id number]     Valid 02/13/23 to 05/19/2023  Location  [facility]

## 2023-02-23 DIAGNOSIS — M16.12 OSTEOARTHRITIS OF LEFT HIP, UNSPECIFIED OSTEOARTHRITIS TYPE: ICD-10-CM

## 2023-02-28 ENCOUNTER — TRANSCRIBE ORDERS (OUTPATIENT)
Dept: SCHEDULING | Age: 53
End: 2023-02-28

## 2023-02-28 DIAGNOSIS — M84.374A STRESS FRACTURE, RIGHT FOOT, INITIAL ENCOUNTER FOR FRACTURE: Primary | ICD-10-CM

## 2023-03-01 LAB — URATE SERPL-MCNC: 4.7 MG/DL (ref 3.8–8.4)

## 2023-03-06 ENCOUNTER — HOSPITAL ENCOUNTER (OUTPATIENT)
Dept: RADIOLOGY | Facility: CLINIC | Age: 53
Discharge: HOME | End: 2023-03-06
Attending: ORTHOPAEDIC SURGERY
Payer: COMMERCIAL

## 2023-03-06 DIAGNOSIS — M84.374A STRESS FRACTURE, RIGHT FOOT, INITIAL ENCOUNTER FOR FRACTURE: ICD-10-CM

## 2023-03-14 LAB — URATE SERPL-MCNC: 5.3 MG/DL (ref 3.8–8.4)

## 2023-03-22 LAB — URATE SERPL-MCNC: 5.5 MG/DL (ref 3.8–8.4)

## 2023-04-07 LAB — URATE SERPL-MCNC: 8.2 MG/DL (ref 3.8–8.4)

## 2023-05-18 ENCOUNTER — HOSPITAL ENCOUNTER (INPATIENT)
Age: 53
LOS: 3 days | Discharge: HOME | DRG: 885 | End: 2023-05-22
Attending: EMERGENCY MEDICINE | Admitting: INTERNAL MEDICINE
Payer: COMMERCIAL

## 2023-05-18 ENCOUNTER — APPOINTMENT (OUTPATIENT)
Dept: CT IMAGING | Age: 53
DRG: 885 | End: 2023-05-18
Payer: COMMERCIAL

## 2023-05-18 ENCOUNTER — APPOINTMENT (OUTPATIENT)
Dept: GENERAL RADIOLOGY | Age: 53
DRG: 885 | End: 2023-05-18
Payer: COMMERCIAL

## 2023-05-18 DIAGNOSIS — F29 PSYCHOSIS, UNSPECIFIED PSYCHOSIS TYPE (HCC): Primary | ICD-10-CM

## 2023-05-18 LAB
ALBUMIN SERPL-MCNC: 4.5 G/DL (ref 3.5–5.2)
ALP SERPL-CCNC: 104 U/L (ref 40–129)
ALT SERPL-CCNC: 33 U/L (ref 5–41)
AMMONIA PLAS-SCNC: 16 UMOL/L (ref 16–60)
AMPHET UR QL SCN: NEGATIVE
ANION GAP SERPL CALCULATED.3IONS-SCNC: 14 MMOL/L (ref 9–17)
APAP SERPL-MCNC: <5 UG/ML (ref 10–30)
AST SERPL-CCNC: 25 U/L
BACTERIA URNS QL MICRO: ABNORMAL
BARBITURATES UR QL SCN: NEGATIVE
BASOPHILS # BLD: 0 K/UL (ref 0–0.2)
BASOPHILS NFR BLD: 0 % (ref 0–2)
BENZODIAZ UR QL: NEGATIVE
BILIRUB DIRECT SERPL-MCNC: 0.2 MG/DL
BILIRUB INDIRECT SERPL-MCNC: 0.6 MG/DL (ref 0–1)
BILIRUB SERPL-MCNC: 0.8 MG/DL (ref 0.3–1.2)
BILIRUB UR QL STRIP: NEGATIVE
BUN SERPL-MCNC: 16 MG/DL (ref 6–20)
CALCIUM SERPL-MCNC: 9.7 MG/DL (ref 8.6–10.4)
CANNABINOID SCREEN URINE: NEGATIVE
CASTS #/AREA URNS LPF: ABNORMAL /LPF
CHARACTER UR: ABNORMAL
CHARACTER UR: ABNORMAL
CHLORIDE SERPL-SCNC: 107 MMOL/L (ref 98–107)
CLARITY UR: CLEAR
CO2 SERPL-SCNC: 21 MMOL/L (ref 20–31)
COCAINE UR QL SCN: NEGATIVE
COLOR UR: ABNORMAL
CREAT SERPL-MCNC: 1.25 MG/DL (ref 0.7–1.2)
EOSINOPHIL # BLD: 0 K/UL (ref 0–0.4)
EOSINOPHILS RELATIVE PERCENT: 0 % (ref 0–4)
EPI CELLS #/AREA URNS HPF: ABNORMAL /HPF
ERYTHROCYTE [DISTWIDTH] IN BLOOD BY AUTOMATED COUNT: 13.9 % (ref 11.5–14.9)
ETHANOL PERCENT: <0.01 %
ETHANOLAMINE SERPL-MCNC: <10 MG/DL
FENTANYL URINE: NEGATIVE
GFR SERPL CREATININE-BSD FRML MDRD: >60 ML/MIN/1.73M2
GLUCOSE SERPL-MCNC: 124 MG/DL (ref 70–99)
GLUCOSE UR STRIP-MCNC: NEGATIVE MG/DL
HCT VFR BLD AUTO: 45.2 % (ref 41–53)
HGB BLD-MCNC: 15.5 G/DL (ref 13.5–17.5)
HGB UR QL STRIP.AUTO: NEGATIVE
INR PPP: 1
KETONES UR STRIP-MCNC: ABNORMAL MG/DL
LACTATE BLDV-SCNC: 1.5 MMOL/L (ref 0.5–1.9)
LEUKOCYTE ESTERASE UR QL STRIP: NEGATIVE
LIPASE SERPL-CCNC: 33 U/L (ref 13–60)
LYMPHOCYTES # BLD: 20 % (ref 24–44)
LYMPHOCYTES NFR BLD: 1.2 K/UL (ref 1–4.8)
MAGNESIUM SERPL-MCNC: 2.2 MG/DL (ref 1.6–2.6)
MCH RBC QN AUTO: 29.6 PG (ref 26–34)
MCHC RBC AUTO-ENTMCNC: 34.3 G/DL (ref 31–37)
MCV RBC AUTO: 86.1 FL (ref 80–100)
METHADONE SCREEN, URINE: NEGATIVE
MONOCYTES NFR BLD: 0.7 K/UL (ref 0.1–1.3)
MONOCYTES NFR BLD: 11 % (ref 1–7)
NEUTROPHILS NFR BLD: 69 % (ref 36–66)
NEUTS SEG NFR BLD: 4.2 K/UL (ref 1.3–9.1)
NITRITE UR QL STRIP: NEGATIVE
OPIATES UR QL SCN: NEGATIVE
OXYCODONE SCREEN URINE: NEGATIVE
PARTIAL THROMBOPLASTIN TIME: 24.2 SEC (ref 24–36)
PCP UR QL SCN: NEGATIVE
PH UR STRIP: 5 [PH] (ref 5–8)
PHOSPHATE SERPL-MCNC: 3.3 MG/DL (ref 2.5–4.5)
PLATELET # BLD AUTO: 229 K/UL (ref 150–450)
PMV BLD AUTO: 7.8 FL (ref 6–12)
POTASSIUM SERPL-SCNC: 3.7 MMOL/L (ref 3.7–5.3)
PROT SERPL-MCNC: 7.7 G/DL (ref 6.4–8.3)
PROT UR STRIP-MCNC: ABNORMAL MG/DL
PROTHROMBIN TIME: 13.7 SEC (ref 11.8–14.6)
RBC # BLD AUTO: 5.25 M/UL (ref 4.5–5.9)
RBC #/AREA URNS HPF: ABNORMAL /HPF
SALICYLATES SERPL-MCNC: <1 MG/DL (ref 3–10)
SODIUM SERPL-SCNC: 142 MMOL/L (ref 135–144)
SP GR UR STRIP: 1.02 (ref 1–1.03)
TEST INFORMATION: NORMAL
TROPONIN I SERPL HS-MCNC: 14 NG/L (ref 0–22)
TROPONIN I SERPL HS-MCNC: 20 NG/L (ref 0–22)
UROBILINOGEN UR STRIP-ACNC: NORMAL
WBC #/AREA URNS HPF: ABNORMAL /HPF
WBC OTHER # BLD: 6.2 K/UL (ref 3.5–11)

## 2023-05-18 PROCEDURE — G0480 DRUG TEST DEF 1-7 CLASSES: HCPCS

## 2023-05-18 PROCEDURE — 85730 THROMBOPLASTIN TIME PARTIAL: CPT

## 2023-05-18 PROCEDURE — 80076 HEPATIC FUNCTION PANEL: CPT

## 2023-05-18 PROCEDURE — 71045 X-RAY EXAM CHEST 1 VIEW: CPT

## 2023-05-18 PROCEDURE — 84100 ASSAY OF PHOSPHORUS: CPT

## 2023-05-18 PROCEDURE — 93005 ELECTROCARDIOGRAM TRACING: CPT | Performed by: EMERGENCY MEDICINE

## 2023-05-18 PROCEDURE — 85610 PROTHROMBIN TIME: CPT

## 2023-05-18 PROCEDURE — 80179 DRUG ASSAY SALICYLATE: CPT

## 2023-05-18 PROCEDURE — 80143 DRUG ASSAY ACETAMINOPHEN: CPT

## 2023-05-18 PROCEDURE — 82140 ASSAY OF AMMONIA: CPT

## 2023-05-18 PROCEDURE — 81001 URINALYSIS AUTO W/SCOPE: CPT

## 2023-05-18 PROCEDURE — 2580000003 HC RX 258: Performed by: EMERGENCY MEDICINE

## 2023-05-18 PROCEDURE — 70450 CT HEAD/BRAIN W/O DYE: CPT

## 2023-05-18 PROCEDURE — 80307 DRUG TEST PRSMV CHEM ANLYZR: CPT

## 2023-05-18 PROCEDURE — 85025 COMPLETE CBC W/AUTO DIFF WBC: CPT

## 2023-05-18 PROCEDURE — 36415 COLL VENOUS BLD VENIPUNCTURE: CPT

## 2023-05-18 PROCEDURE — 99285 EMERGENCY DEPT VISIT HI MDM: CPT

## 2023-05-18 PROCEDURE — 83735 ASSAY OF MAGNESIUM: CPT

## 2023-05-18 PROCEDURE — 83605 ASSAY OF LACTIC ACID: CPT

## 2023-05-18 PROCEDURE — 96360 HYDRATION IV INFUSION INIT: CPT

## 2023-05-18 PROCEDURE — 83690 ASSAY OF LIPASE: CPT

## 2023-05-18 PROCEDURE — 80048 BASIC METABOLIC PNL TOTAL CA: CPT

## 2023-05-18 PROCEDURE — 84484 ASSAY OF TROPONIN QUANT: CPT

## 2023-05-18 RX ORDER — 0.9 % SODIUM CHLORIDE 0.9 %
2000 INTRAVENOUS SOLUTION INTRAVENOUS ONCE
Status: COMPLETED | OUTPATIENT
Start: 2023-05-18 | End: 2023-05-18

## 2023-05-18 RX ADMIN — SODIUM CHLORIDE 2000 ML: 9 INJECTION, SOLUTION INTRAVENOUS at 20:34

## 2023-05-19 PROBLEM — E66.9 OBESITY (BMI 30.0-34.9): Status: ACTIVE | Noted: 2023-05-19

## 2023-05-19 PROBLEM — I10 BENIGN ESSENTIAL HTN: Status: ACTIVE | Noted: 2023-05-19

## 2023-05-19 PROBLEM — F23 ACUTE PSYCHOSIS (HCC): Status: ACTIVE | Noted: 2023-05-19

## 2023-05-19 PROBLEM — N17.9 AKI (ACUTE KIDNEY INJURY) (HCC): Status: ACTIVE | Noted: 2023-05-19

## 2023-05-19 PROBLEM — R00.0 TACHYCARDIA: Status: ACTIVE | Noted: 2023-05-19

## 2023-05-19 PROBLEM — F29 PSYCHOSIS (HCC): Status: ACTIVE | Noted: 2023-05-19

## 2023-05-19 LAB
ANION GAP SERPL CALCULATED.3IONS-SCNC: 11 MMOL/L (ref 9–17)
BUN SERPL-MCNC: 14 MG/DL (ref 6–20)
CALCIUM SERPL-MCNC: 9.6 MG/DL (ref 8.6–10.4)
CHLORIDE SERPL-SCNC: 110 MMOL/L (ref 98–107)
CO2 SERPL-SCNC: 27 MMOL/L (ref 20–31)
CREAT SERPL-MCNC: 1.19 MG/DL (ref 0.7–1.2)
EKG ATRIAL RATE: 82 BPM
EKG P AXIS: 12 DEGREES
EKG P-R INTERVAL: 146 MS
EKG Q-T INTERVAL: 406 MS
EKG QRS DURATION: 92 MS
EKG QTC CALCULATION (BAZETT): 474 MS
EKG R AXIS: -12 DEGREES
EKG T AXIS: -2 DEGREES
EKG VENTRICULAR RATE: 82 BPM
GFR SERPL CREATININE-BSD FRML MDRD: >60 ML/MIN/1.73M2
GLUCOSE SERPL-MCNC: 113 MG/DL (ref 70–99)
POTASSIUM SERPL-SCNC: 4.4 MMOL/L (ref 3.7–5.3)
SODIUM SERPL-SCNC: 148 MMOL/L (ref 135–144)
TSH SERPL-MCNC: 1.13 UIU/ML (ref 0.3–5)

## 2023-05-19 PROCEDURE — 6360000002 HC RX W HCPCS: Performed by: PSYCHIATRY & NEUROLOGY

## 2023-05-19 PROCEDURE — 93010 ELECTROCARDIOGRAM REPORT: CPT | Performed by: INTERNAL MEDICINE

## 2023-05-19 PROCEDURE — 80048 BASIC METABOLIC PNL TOTAL CA: CPT

## 2023-05-19 PROCEDURE — 1240000000 HC EMOTIONAL WELLNESS R&B

## 2023-05-19 PROCEDURE — 84443 ASSAY THYROID STIM HORMONE: CPT

## 2023-05-19 PROCEDURE — 99222 1ST HOSP IP/OBS MODERATE 55: CPT | Performed by: INTERNAL MEDICINE

## 2023-05-19 PROCEDURE — 36415 COLL VENOUS BLD VENIPUNCTURE: CPT

## 2023-05-19 RX ORDER — LORAZEPAM 2 MG/ML
2 INJECTION INTRAMUSCULAR EVERY 4 HOURS PRN
Status: DISCONTINUED | OUTPATIENT
Start: 2023-05-19 | End: 2023-05-22 | Stop reason: HOSPADM

## 2023-05-19 RX ORDER — IBUPROFEN 400 MG/1
400 TABLET ORAL EVERY 6 HOURS PRN
Status: DISCONTINUED | OUTPATIENT
Start: 2023-05-19 | End: 2023-05-22 | Stop reason: HOSPADM

## 2023-05-19 RX ORDER — FEBUXOSTAT 40 MG/1
40 TABLET, FILM COATED ORAL DAILY
Status: ON HOLD | COMMUNITY
Start: 2023-03-22 | End: 2023-05-22 | Stop reason: HOSPADM

## 2023-05-19 RX ORDER — RISPERIDONE 0.25 MG/1
0.25 TABLET ORAL 2 TIMES DAILY
Status: DISCONTINUED | OUTPATIENT
Start: 2023-05-19 | End: 2023-05-21

## 2023-05-19 RX ORDER — HALOPERIDOL 5 MG/1
5 TABLET ORAL EVERY 4 HOURS PRN
Status: DISCONTINUED | OUTPATIENT
Start: 2023-05-19 | End: 2023-05-22 | Stop reason: HOSPADM

## 2023-05-19 RX ORDER — HYDRALAZINE HYDROCHLORIDE 10 MG/1
10 TABLET, FILM COATED ORAL EVERY 8 HOURS PRN
Status: DISCONTINUED | OUTPATIENT
Start: 2023-05-19 | End: 2023-05-22 | Stop reason: HOSPADM

## 2023-05-19 RX ORDER — ACETAMINOPHEN 325 MG/1
650 TABLET ORAL EVERY 4 HOURS PRN
Status: DISCONTINUED | OUTPATIENT
Start: 2023-05-19 | End: 2023-05-22 | Stop reason: HOSPADM

## 2023-05-19 RX ORDER — DIPHENHYDRAMINE HYDROCHLORIDE 50 MG/ML
50 INJECTION INTRAMUSCULAR; INTRAVENOUS EVERY 4 HOURS PRN
Status: DISCONTINUED | OUTPATIENT
Start: 2023-05-19 | End: 2023-05-22 | Stop reason: HOSPADM

## 2023-05-19 RX ORDER — POLYETHYLENE GLYCOL 3350 17 G/17G
17 POWDER, FOR SOLUTION ORAL DAILY PRN
Status: DISCONTINUED | OUTPATIENT
Start: 2023-05-19 | End: 2023-05-22 | Stop reason: HOSPADM

## 2023-05-19 RX ORDER — HYDROXYZINE 50 MG/1
50 TABLET, FILM COATED ORAL 3 TIMES DAILY PRN
Status: DISCONTINUED | OUTPATIENT
Start: 2023-05-19 | End: 2023-05-22 | Stop reason: HOSPADM

## 2023-05-19 RX ORDER — MAGNESIUM HYDROXIDE/ALUMINUM HYDROXICE/SIMETHICONE 120; 1200; 1200 MG/30ML; MG/30ML; MG/30ML
30 SUSPENSION ORAL EVERY 6 HOURS PRN
Status: DISCONTINUED | OUTPATIENT
Start: 2023-05-19 | End: 2023-05-22 | Stop reason: HOSPADM

## 2023-05-19 RX ORDER — HALOPERIDOL 5 MG/ML
5 INJECTION INTRAMUSCULAR EVERY 4 HOURS PRN
Status: DISCONTINUED | OUTPATIENT
Start: 2023-05-19 | End: 2023-05-22 | Stop reason: HOSPADM

## 2023-05-19 RX ORDER — TRAZODONE HYDROCHLORIDE 50 MG/1
50 TABLET ORAL NIGHTLY PRN
Status: DISCONTINUED | OUTPATIENT
Start: 2023-05-19 | End: 2023-05-22 | Stop reason: HOSPADM

## 2023-05-19 RX ORDER — LORAZEPAM 1 MG/1
2 TABLET ORAL EVERY 4 HOURS PRN
Status: DISCONTINUED | OUTPATIENT
Start: 2023-05-19 | End: 2023-05-22 | Stop reason: HOSPADM

## 2023-05-19 RX ORDER — RISPERIDONE 0.25 MG/1
0.25 TABLET ORAL DAILY
Status: DISCONTINUED | OUTPATIENT
Start: 2023-05-19 | End: 2023-05-19

## 2023-05-19 RX ORDER — RISPERIDONE 0.25 MG/1
0.25 TABLET ORAL 2 TIMES DAILY
Status: DISCONTINUED | OUTPATIENT
Start: 2023-05-19 | End: 2023-05-19

## 2023-05-19 RX ORDER — AMLODIPINE BESYLATE 5 MG/1
5 TABLET ORAL DAILY
Status: DISCONTINUED | OUTPATIENT
Start: 2023-05-19 | End: 2023-05-22 | Stop reason: HOSPADM

## 2023-05-19 RX ADMIN — DIPHENHYDRAMINE HYDROCHLORIDE 50 MG: 50 INJECTION, SOLUTION INTRAMUSCULAR; INTRAVENOUS at 01:37

## 2023-05-19 RX ADMIN — HALOPERIDOL LACTATE 5 MG: 5 INJECTION, SOLUTION INTRAMUSCULAR at 01:36

## 2023-05-19 RX ADMIN — LORAZEPAM 2 MG: 2 INJECTION INTRAMUSCULAR; INTRAVENOUS at 01:36

## 2023-05-19 ASSESSMENT — SLEEP AND FATIGUE QUESTIONNAIRES
DO YOU HAVE DIFFICULTY SLEEPING: COMMENT
DO YOU USE A SLEEP AID: COMMENT
AVERAGE NUMBER OF SLEEP HOURS: 3

## 2023-05-19 ASSESSMENT — LIFESTYLE VARIABLES
HOW MANY STANDARD DRINKS CONTAINING ALCOHOL DO YOU HAVE ON A TYPICAL DAY: PATIENT DOES NOT DRINK
HOW OFTEN DO YOU HAVE A DRINK CONTAINING ALCOHOL: NEVER

## 2023-05-20 PROCEDURE — 6370000000 HC RX 637 (ALT 250 FOR IP): Performed by: PSYCHIATRY & NEUROLOGY

## 2023-05-20 PROCEDURE — 1240000000 HC EMOTIONAL WELLNESS R&B

## 2023-05-20 PROCEDURE — 6370000000 HC RX 637 (ALT 250 FOR IP): Performed by: INTERNAL MEDICINE

## 2023-05-20 RX ADMIN — RISPERIDONE 0.25 MG: 0.25 TABLET ORAL at 20:35

## 2023-05-20 RX ADMIN — RISPERIDONE 0.25 MG: 0.25 TABLET ORAL at 09:26

## 2023-05-20 RX ADMIN — AMLODIPINE BESYLATE 5 MG: 5 TABLET ORAL at 09:26

## 2023-05-21 PROCEDURE — 6370000000 HC RX 637 (ALT 250 FOR IP): Performed by: INTERNAL MEDICINE

## 2023-05-21 PROCEDURE — 6370000000 HC RX 637 (ALT 250 FOR IP): Performed by: PSYCHIATRY & NEUROLOGY

## 2023-05-21 PROCEDURE — 1240000000 HC EMOTIONAL WELLNESS R&B

## 2023-05-21 RX ORDER — RISPERIDONE 0.5 MG/1
0.5 TABLET ORAL 2 TIMES DAILY
Status: DISCONTINUED | OUTPATIENT
Start: 2023-05-21 | End: 2023-05-21

## 2023-05-21 RX ORDER — RISPERIDONE 1 MG/1
1 TABLET ORAL 2 TIMES DAILY
Status: DISCONTINUED | OUTPATIENT
Start: 2023-05-21 | End: 2023-05-22 | Stop reason: HOSPADM

## 2023-05-21 RX ADMIN — RISPERIDONE 1 MG: 1 TABLET ORAL at 20:32

## 2023-05-21 RX ADMIN — RISPERIDONE 0.25 MG: 0.25 TABLET ORAL at 08:41

## 2023-05-21 RX ADMIN — AMLODIPINE BESYLATE 5 MG: 5 TABLET ORAL at 08:42

## 2023-05-22 VITALS
BODY MASS INDEX: 34.36 KG/M2 | TEMPERATURE: 98.2 F | HEART RATE: 111 BPM | DIASTOLIC BLOOD PRESSURE: 91 MMHG | SYSTOLIC BLOOD PRESSURE: 147 MMHG | HEIGHT: 70 IN | RESPIRATION RATE: 14 BRPM | WEIGHT: 240 LBS | OXYGEN SATURATION: 100 %

## 2023-05-22 PROCEDURE — 99239 HOSP IP/OBS DSCHRG MGMT >30: CPT | Performed by: PSYCHIATRY & NEUROLOGY

## 2023-05-22 PROCEDURE — 93005 ELECTROCARDIOGRAM TRACING: CPT | Performed by: PSYCHIATRY & NEUROLOGY

## 2023-05-22 PROCEDURE — 6370000000 HC RX 637 (ALT 250 FOR IP): Performed by: INTERNAL MEDICINE

## 2023-05-22 PROCEDURE — 6370000000 HC RX 637 (ALT 250 FOR IP): Performed by: PSYCHIATRY & NEUROLOGY

## 2023-05-22 RX ORDER — RISPERIDONE 1 MG/1
1 TABLET ORAL 2 TIMES DAILY
Qty: 60 TABLET | Refills: 3 | Status: SHIPPED | OUTPATIENT
Start: 2023-05-22

## 2023-05-22 RX ORDER — AMLODIPINE BESYLATE 5 MG/1
5 TABLET ORAL DAILY
Qty: 30 TABLET | Refills: 3 | Status: SHIPPED | OUTPATIENT
Start: 2023-05-23

## 2023-05-22 RX ADMIN — AMLODIPINE BESYLATE 5 MG: 5 TABLET ORAL at 08:11

## 2023-05-22 RX ADMIN — RISPERIDONE 1 MG: 1 TABLET ORAL at 08:11

## 2023-05-22 NOTE — PROGRESS NOTES
05/22/23 1353   Encounter Summary   Encounter Overview/Reason   Encounter   Service Provided For: Patient   Referral/Consult From: Rounding   Last Encounter  05/22/23   Complexity of Encounter Low   Spiritual/Emotional needs   Type Spiritual Support   Rituals, Rites and Sacraments   Type Anointing  (Dorene Stark 5-22-23)
05/22/23 1402   Encounter Summary   Encounter Overview/Reason  Spiritual/Emotional Needs   Service Provided For: Patient   Referral/Consult From: Rounding   Last Encounter  05/22/23   Complexity of Encounter Moderate   Begin Time 0130   End Time  0200   Total Time Calculated 30 min   Spiritual/Emotional needs   Type Spiritual Support   Behavioral Health    Type  Spirituality Group   Assessment/Intervention/Outcome   Assessment Calm;Coping   Intervention Active listening;Sustaining Presence/Ministry of presence; Explored/Affirmed feelings, thoughts, concerns   Outcome Acceptance;Comfort; Connection/Belonging;Encouraged;Engaged in conversation;Receptive
constipation, change in bowel habits, abdominal pain   GENITOURINARY:  negative for difficulty of urination, burning with urination, frequency   INTEGUMENT:  negative for rash, skin lesions, easy bruising   HEMATOLOGIC/LYMPHATIC:  negative for swelling/edema   ALLERGIC/IMMUNOLOGIC:  negative for urticaria , itching  ENDOCRINE:  negative increase in drinking, increase in urination, hot or cold intolerance  MUSCULOSKELETAL:  negative joint pains, muscle aches, swelling of joints  NEUROLOGICAL:  negative for headaches, dizziness, lightheadedness, numbness, pain, tingling extremities      Physical Exam:     BP (!) 154/108   Pulse (!) 104   Temp 98.2 °F (36.8 °C) (Temporal)   Resp 14   Ht 5' 10\" (1.778 m)   Wt 240 lb (108.9 kg)   SpO2 100%   BMI 34.44 kg/m²   Temp (24hrs), Av.3 °F (36.8 °C), Min:98.1 °F (36.7 °C), Max:98.5 °F (36.9 °C)    No results for input(s): POCGLU in the last 72 hours. No intake or output data in the 24 hours ending 23 0909      General Appearance:  alert, well appearing, and in no acute distress  Mental status: oriented to person, place, and time   Head:  normocephalic, atraumatic. Neck: supple, no carotid bruits, thyroid not palpable  Lungs: Bilateral equal air entry, clear to ausculation, no wheezing, rales or rhonchi, normal effort  Cardiovascular: normal rate, regular rhythm, no murmur, gallop, rub. Abdomen: Soft, nontender, nondistended, normal bowel sounds, no hepatomegaly or splenomegaly  Neurologic: There are no new focal motor or sensory deficits, normal muscle tone and bulk, no abnormal sensation, normal speech, cranial nerves II through XII grossly intact  Skin: No gross lesions, rashes, bruising or bleeding on exposed skin area  Extremities:  peripheral pulses palpable, no pedal edema or calf pain with palpation    Investigations:      Laboratory Testing:  No results found for this or any previous visit (from the past 24 hour(s)).       Imaging/Diagonstics:  CT HEAD

## 2023-05-22 NOTE — DISCHARGE INSTRUCTIONS
Information:  Medications:   Medication summary provided   I understand that I should take only the medications on my list.     -why and when I need to take each medicine.     -which side effects to watch for.     -that I should carry my medication information at all times in case of     Emergency situations. I will take all of my medicines to follow up appointments.     -check with my physician or pharmacist before taking any new    Medication, over the counter product or drink alcohol.    -Ask about food, drug or dietary supplement interactions.    -discard old lists and update records with medication providers. Notify Physician:  Notify physician if you notice:   Always call 911 if you feel your life is in danger  In case of an emergency call 911 immediately! If 911 is not available call your local emergency medical system for help    Behavioral Health Follow Up:  Original Referral Source:TOMMY  Discharge Diagnosis: Acute psychosis ([facility] [address] [F23]  Psychosis, unspecified psychosis type ([facility] [address] [F29]  Recommendations for Level of Care: Follow up  Patient status at discharge: Stable  My hospital  was: [facility]  Aftercare plan faxed: ***   -faxed by: Yoshi Pizano   -date: 5/22/23   -time: 1600  Prescriptions: [facility] in [address]    Smoking: Quit Smoking. Call the [employer]'s smoking quitline at 4-415-83Y-QUIT  Know the signs of a heart attack   If you have any of the following symptoms call 911 immediately, do not wait more    Than five minutes. 1. Pressure, fullness and/ or squeezing in the center of the chest spreading to    The jaw, neck or shoulder. 2. Chest discomfort with light headedness, fainting, sweating, nausea or    Shortness of breath. 3. Upper abdominal pressure or discomfort. 4. Lower chest pain, back pain, unusual fatigue, shortness of breath, nausea   Or dizziness.      General Information:   Questions regarding your bill: Call HELP program [phone]     Suicide Hotline

## 2023-05-22 NOTE — BH NOTE
At this time writer communicated with [facility]  via phone call discussing patient increased blood pressure and pulse.

## 2023-05-22 NOTE — PLAN OF CARE
Problem: Kellen  Goal: Will exhibit normal sleep and speech and no impulsivity  Description: INTERVENTIONS:  1. Administer medication as ordered  2. Set limits on impulsive behavior  3. Make attempts to decrease external stimuli as possible  5/21/2023 2127 by Adan Renteria RN  Outcome: Progressing     Problem: Psychosis  Goal: Will report no hallucinations or delusions  Description: INTERVENTIONS:  1. Administer medication as  ordered  2. Assist with reality testing to support increasing orientation  3. Assess if patient's hallucinations or delusions are encouraging self harm or harm to others and intervene as appropriate  5/21/2023 2127 by Adan Renteria RN  Outcome: Progressing  Note: Patient denied feelings of anxiety and depression during assessment. He was very calm and cooperative. He states his time here has cleared his mind and he feels ready to leave. He remains aloof from peers or in his bedroom reading the bible. He denies auditory and visual hallucinations. Problem: Risk for Elopement  Goal: Patient will not exit the unit/facility without proper excort  5/21/2023 2127 by Adan Renteria RN  Outcome: Progressing  Note: Patient does not present exit seeking behaviors.

## 2023-05-22 NOTE — GROUP NOTE
Group Therapy Note    Date: 5/22/2023    Group Start Time: 1100  Group End Time: 1150  Group Topic: Cognitive Skills    DARINEL [place], [place]        Group Therapy Note    Attendees: 10/19     Comments:     Topic: To increase social interaction, decision making, and communication skills. Patient did not participate in Cognitive Skills Group at 11:00, despite staff encouragement and explanation of benefits. Pt was seclusive to self et room during group time. Q15 minute safety checks maintained for patient safety and will continue to encourage patient to attend unit programming.            Discipline Responsible: Psychoeducational Specialist        Signature:  Yolande Alba

## 2023-05-22 NOTE — GROUP NOTE
Group Therapy Note    Date: 5/22/2023    Group Start Time: 1430  Group End Time: 2626  Group Topic: Cognitive Skills    [facility]    [place], [place]        Group Therapy Note    Attendees: 10/17     Comments:     Topic: To increase social interaction and to practice expressing feelings, and explore  stress management, relaxation resources and skills. .       Patient did not participate in Cognitive Skills Group at 14:30, despite staff encouragement and explanation of benefits. Pt was seclusive to self et room during group time. Q15 minute safety checks maintained for patient safety and will continue to encourage patient to attend unit programming.            Discipline Responsible: Psychoeducational Specialist        Signature:  Leonel Berry

## 2023-05-22 NOTE — DISCHARGE SUMMARY
DISCHARGE SUMMARY      Patient ID:  Sam Lemus  [id number]  55 y.o.  1970    Admit date: 5/18/2023    Discharge date and time: 5/22/2023    Disposition: Home     Admitting Physician: Marizol Burkett MD     Discharge Physician: Dr Jose Manuel Barnes MD    Admission Diagnoses: Acute psychosis ([facility] [address] [facility] [F23]  Psychosis, unspecified psychosis type ([facility] [address] [F29]    Admission Condition: poor    Discharged Condition: stable    Admission Circumstance: Sam Lemus is a 52 y.o. male who has a past medical history of gout and no significant mental history. Patient presented to the ED escorted by the [facility] due to bizarre behaviors exhibited on the highway. Per emergency department documentation :57-year-old male presenting in an altered mental state. The patient became psychotic without any history while driving with family. Upon arrival EMS did have to give a dose of ketamine in order to sedate the patient because he would not let go of family members and was acting combative. Ova Passer was driving and started acting \"off\" was driving slow then would speed up to 90 and driving erratic at one point per wife patient closed his eyes and took his hands off the wheel. Wife states she was finally able to get patient to pull to the side of the turnpike but it took [facility] patrol and herself two hours to get patient out of  seat. Patient was yelling about god being in control and other odd things per wife. Patient was combative on scene and was given 400mg ketamine per EMS. Patient does not do any drugs or alcohol per wife. \"     At diagnostic assessment patient is willing to participate fully. He shares that he does not recall the events that led to his admission and explains this as \"I experienced a blackout \". He denies alcohol or illicit drugs and his lab values indicate that this is most certainly accurate. His medical evaluation has been unremarkable.   He does report that he has a history of gout and has

## 2023-05-22 NOTE — BH NOTE
[facility]  Discharge Note    Pt discharged with followings belongings:   Dental Appliances: None  Vision - Corrective Lenses: None  Hearing Aid: None  Jewelry: None  Body Piercings Removed: No  Clothing: Belt, Shirt, Shorts, Undergarments  Other Valuables: Money ($0.60)   Valuables sent home with patient. Patient educated on aftercare instructions: Yes  Information faxed to Dr. Burns Angelucci by staff  at 3:58 PM .Patient verbalize understanding of AVS:  Yes. Patient remains free from harm to self and others and denies thoughts of. Patient denies any suicidal or homicidal thoughts at this time. Patient discharged to wife in a car. Status EXAM upon discharge:  Mental Status and Behavioral Exam  Normal: No  Level of Assistance: Independent/Self  Facial Expression: Flat  Affect: Blunt  Level of Consciousness: Alert  Frequency of Checks: 4 times per hour, close  Mood:Normal: Yes  Mood: Anxious, Guilty  Motor Activity:Normal: Yes  Motor Activity: Decreased  Eye Contact: Good  Observed Behavior: Cooperative, Preoccupied, Friendly  Sexual Misconduct History: Current - no  Preception: [place] to person, [place] to time, [place] to place, [place] to situation  Attention:Normal: No  Attention: Distractible  Thought Processes: Circumstantial  Thought Content:Normal: No  Thought Content: Preoccupations  Depression Symptoms: No problems reported or observed. Anxiety Symptoms: Generalized  Kellen Symptoms: No problems reported or observed.   Hallucinations: None  Delusions: Yes  Delusions: Pentecostal  Memory:Normal: No  Memory: Confabulation  Insight and Judgment: No  Insight and Judgment: Poor judgment, Poor insight    Tobacco Screening:  Practical Counseling, on admission, francisca X, if applicable and completed (first 3 are required if patient doesn't refuse):            ( ) Recognizing danger situations (included triggers and roadblocks)                    ( ) Coping skills (new ways to manage stress,relaxation

## 2023-05-23 LAB
EKG ATRIAL RATE: 86 BPM
EKG P AXIS: 26 DEGREES
EKG P-R INTERVAL: 116 MS
EKG Q-T INTERVAL: 374 MS
EKG QRS DURATION: 88 MS
EKG QTC CALCULATION (BAZETT): 447 MS
EKG R AXIS: 33 DEGREES
EKG T AXIS: -11 DEGREES
EKG VENTRICULAR RATE: 86 BPM

## 2023-05-23 PROCEDURE — 93010 ELECTROCARDIOGRAM REPORT: CPT | Performed by: INTERNAL MEDICINE

## 2023-05-23 NOTE — CARE COORDINATION
Name: Baldev Perera    : 1970    Discharge Date: 2023    Primary Auth/Cert #: [id number]    Destination: home with wife    Discharge Medications:      Medication List        START taking these medications      amLODIPine 5 MG tablet  Commonly known as: NORVASC  Take 1 tablet by mouth daily  Notes to patient: High blood pressure     risperiDONE 1 MG tablet  Commonly known as: RISPERDAL  Take 1 tablet by mouth 2 times daily  Notes to patient: mood            STOP taking these medications      febuxostat 40 MG Tabs tablet  Commonly known as: ULORIC               Where to Get Your Medications        [address] [facility] [address]  [address], [place] [address]      Phone: [phone]   amLODIPine 5 MG tablet  risperiDONE 1 MG tablet         Follow Up Appointment: Robe Rivera DO  [address]  Ph: [phone]  Fax: [phone]  Go on 2023  You have an appointment at 11:30am with Rox Haines

## 2023-05-25 ENCOUNTER — OFFICE VISIT (OUTPATIENT)
Dept: PRIMARY CARE | Facility: CLINIC | Age: 53
End: 2023-05-25
Payer: COMMERCIAL

## 2023-05-25 VITALS
HEIGHT: 71 IN | BODY MASS INDEX: 33.04 KG/M2 | OXYGEN SATURATION: 98 % | SYSTOLIC BLOOD PRESSURE: 132 MMHG | TEMPERATURE: 98.7 F | HEART RATE: 84 BPM | WEIGHT: 236 LBS | RESPIRATION RATE: 18 BRPM | DIASTOLIC BLOOD PRESSURE: 76 MMHG

## 2023-05-25 DIAGNOSIS — E78.2 MIXED HYPERLIPIDEMIA: ICD-10-CM

## 2023-05-25 DIAGNOSIS — I10 BENIGN ESSENTIAL HTN: ICD-10-CM

## 2023-05-25 DIAGNOSIS — N17.9 AKI (ACUTE KIDNEY INJURY) (CMS/HCC): ICD-10-CM

## 2023-05-25 DIAGNOSIS — M87.051 AVASCULAR NECROSIS OF BONE OF HIP, RIGHT (CMS/HCC): ICD-10-CM

## 2023-05-25 DIAGNOSIS — F23 ACUTE PSYCHOSIS (CMS/HCC): Primary | ICD-10-CM

## 2023-05-25 DIAGNOSIS — M1A.09X0 IDIOPATHIC CHRONIC GOUT OF MULTIPLE SITES WITHOUT TOPHUS: ICD-10-CM

## 2023-05-25 DIAGNOSIS — E66.3 OVERWEIGHT: ICD-10-CM

## 2023-05-25 PROBLEM — F29 PSYCHOSIS (CMS/HCC): Status: ACTIVE | Noted: 2023-05-19

## 2023-05-25 PROBLEM — E66.811 OBESITY (BMI 30.0-34.9): Status: ACTIVE | Noted: 2023-05-19

## 2023-05-25 PROCEDURE — 99214 OFFICE O/P EST MOD 30 MIN: CPT | Performed by: FAMILY MEDICINE

## 2023-05-25 PROCEDURE — 3075F SYST BP GE 130 - 139MM HG: CPT | Performed by: FAMILY MEDICINE

## 2023-05-25 PROCEDURE — 3078F DIAST BP <80 MM HG: CPT | Performed by: FAMILY MEDICINE

## 2023-05-25 PROCEDURE — 3008F BODY MASS INDEX DOCD: CPT | Performed by: FAMILY MEDICINE

## 2023-05-25 RX ORDER — RISPERIDONE 1 MG/1
1 TABLET ORAL 2 TIMES DAILY
COMMUNITY
Start: 2023-05-22

## 2023-05-25 RX ORDER — AMLODIPINE BESYLATE 5 MG/1
5 TABLET ORAL DAILY
COMMUNITY
Start: 2023-05-23

## 2023-05-25 ASSESSMENT — ENCOUNTER SYMPTOMS
HALLUCINATIONS: 1
DYSPHORIC MOOD: 1
SLEEP DISTURBANCE: 1
DYSURIA: 0
ACTIVITY CHANGE: 0
WEAKNESS: 0
ARTHRALGIAS: 0
TROUBLE SWALLOWING: 0
CHEST TIGHTNESS: 0
HEADACHES: 0
CONFUSION: 1
HYPERACTIVE: 1
FATIGUE: 0
NAUSEA: 0
DIARRHEA: 0
AGITATION: 1
SHORTNESS OF BREATH: 0
BACK PAIN: 0
EYE PAIN: 0
BLOOD IN STOOL: 0
NERVOUS/ANXIOUS: 1
NECK PAIN: 0
FREQUENCY: 0
PALPITATIONS: 0
ABDOMINAL PAIN: 0

## 2023-05-25 NOTE — PROGRESS NOTES
Daily Progress Note      Subjective      Patient ID: Parvez Salguero is a 53 y.o. male.    HPI  See prior notes. Had psych break, still w/ denial, confabulation, dviersion    The following have been reviewed and updated as appropriate in this visit:   Problems       Review of Systems   Constitutional: Negative for activity change and fatigue.   HENT: Negative for congestion, ear pain, tinnitus and trouble swallowing.    Eyes: Negative for pain.   Respiratory: Negative for chest tightness and shortness of breath.    Cardiovascular: Negative for chest pain and palpitations.   Gastrointestinal: Negative for abdominal pain, blood in stool, diarrhea and nausea.   Genitourinary: Negative for dysuria, frequency and urgency.   Musculoskeletal: Negative for arthralgias, back pain and neck pain.   Neurological: Negative for weakness and headaches.   Psychiatric/Behavioral: Positive for agitation, behavioral problems, confusion, dysphoric mood, hallucinations and sleep disturbance. The patient is nervous/anxious and is hyperactive.    All other systems reviewed and are negative.      Current Outpatient Medications   Medication Sig Dispense Refill   • amLODIPine (NORVASC) 5 mg tablet Take 5 mg by mouth daily.     • risperiDONE (RisperDAL) 1 mg tablet Take 1 tablet by mouth 2 (two) times a day.       No current facility-administered medications for this visit.     History reviewed. No pertinent past medical history.  Family History   Problem Relation Age of Onset   • No Known Problems Biological Mother    • Diabetes Biological Father    • No Known Problems Maternal Grandmother    • No Known Problems Maternal Grandfather    • No Known Problems Paternal Grandmother    • No Known Problems Paternal Grandfather      History reviewed. No pertinent surgical history.  Social History     Socioeconomic History   • Marital status:      Spouse name: Not on file   • Number of children: Not on file   • Years of education: Not on file    • Highest education level: Not on file   Occupational History   • Not on file   Tobacco Use   • Smoking status: Never   • Smokeless tobacco: Never   Vaping Use   • Vaping status: Never Used     Passive vaping exposure: Yes   Substance and Sexual Activity   • Alcohol use: Yes   • Drug use: Never   • Sexual activity: Yes     Partners: Female     Birth control/protection: None   Other Topics Concern   • Not on file   Social History Narrative   • Not on file     Social Determinants of Health     Financial Resource Strain: Not on file   Food Insecurity: Not on file   Transportation Needs: Not on file   Physical Activity: Not on file   Stress: Not on file   Social Connections: Not on file   Intimate Partner Violence: Not on file   Housing Stability: Not on file     Allergies   Allergen Reactions   • Allopurinol      Side effect       Objective   I have reviewed the patient's labs to the time of note. No new clinical concern.    Physical Exam  Psychiatric:         Attention and Perception: He perceives auditory and visual hallucinations.         Mood and Affect: Mood is anxious and elated. Affect is labile and inappropriate.         Speech: Speech is rapid and pressured and tangential.         Behavior: Behavior is agitated.         Thought Content: Thought content is delusional.         Cognition and Memory: Cognition is impaired.         Judgment: Judgment is impulsive and inappropriate.         Assessment/Plan     Problem List Items Addressed This Visit        Circulatory    Benign essential HTN    Relevant Medications    amLODIPine (NORVASC) 5 mg tablet       Genitourinary    CANDELARIO (acute kidney injury) (CMS/[facility])       Mental Health    Psychosis (CMS/[facility]) - Primary    Relevant Medications    risperiDONE (RisperDAL) 1 mg tablet       Other    Idiopathic chronic gout of multiple sites without tophus    Mixed hyperlipidemia   Other Visit Diagnoses     Overweight        Avascular necrosis of bone of hip, right (CMS/[facility])             Orders Placed This Encounter   Procedures   • Ambulatory referral to Psychology     Standing Status:   Future     Standing Expiration Date:   11/25/2023     Referral Priority:   Routine     Referral Type:   Psychiatric     Referral Reason:   Specialty Services Required     Number of Visits Requested:   10   • Ambulatory referral to Psychiatry     Standing Status:   Future     Standing Expiration Date:   11/25/2023     Referral Priority:   Routine     Referral Type:   Psychiatric     Referral Reason:   Specialty Services Required     Referred to Provider:   Quincy Jones MD     Number of Visits Requested:   10     Reviewed care everywhere notes  Have been managing since admission  Discussed, described, reviewed at length  counseled  Will refer to psy  Refer to psych  Needs to be on rx  Patient is in denial and, imo, active psychoses, high adrenergia  Re-check 2 w  Will w/u med issues later    Christos Amin DO  5/25/2023

## 2024-04-23 DIAGNOSIS — M10.9 GOUT OF MULTIPLE SITES, UNSPECIFIED CAUSE, UNSPECIFIED CHRONICITY: Primary | ICD-10-CM

## 2024-07-13 ENCOUNTER — HOSPITAL ENCOUNTER (OUTPATIENT)
Facility: CLINIC | Age: 54
Discharge: HOME | End: 2024-07-13
Attending: FAMILY MEDICINE
Payer: COMMERCIAL

## 2024-07-13 VITALS — HEIGHT: 71 IN | WEIGHT: 230 LBS | BODY MASS INDEX: 32.2 KG/M2 | OXYGEN SATURATION: 97 % | TEMPERATURE: 97.9 F

## 2024-07-13 DIAGNOSIS — M76.71 TENDINITIS OF RIGHT PERONEUS BREVIS TENDON: Primary | ICD-10-CM

## 2024-07-13 PROCEDURE — S9083 URGENT CARE CENTER GLOBAL: HCPCS | Performed by: FAMILY MEDICINE

## 2024-07-13 PROCEDURE — 99213 OFFICE O/P EST LOW 20 MIN: CPT | Performed by: FAMILY MEDICINE

## 2024-07-13 RX ORDER — METHYLPREDNISOLONE 4 MG/1
TABLET ORAL
Qty: 21 TABLET | Refills: 0 | Status: SHIPPED | OUTPATIENT
Start: 2024-07-13

## 2024-07-13 NOTE — ED PROVIDER NOTES
History  Chief Complaint   Patient presents with    Right foot swelling      Parvez is a 53 yo male presenting with right foot and ankle swelling. He explains this is a very similar presentation to his h/o gout. He took indomethacin with some, but minimal improvement.          History provided by:  Patient      No past medical history on file.    No past surgical history on file.    Family History   Problem Relation Age of Onset    No Known Problems Biological Mother     Diabetes Biological Father     No Known Problems Maternal Grandmother     No Known Problems Maternal Grandfather     No Known Problems Paternal Grandmother     No Known Problems Paternal Grandfather        Social History     Tobacco Use    Smoking status: Never    Smokeless tobacco: Never   Vaping Use    Vaping Use: Never used   Substance Use Topics    Alcohol use: Yes    Drug use: Never       Review of Systems   Constitutional:  Negative for chills, fatigue and fever.   Musculoskeletal:  Positive for arthralgias, gait problem and joint swelling.   Skin:  Negative for rash.       Physical Exam  ED Triage Vitals [07/13/24 1239]   Temp Pulse Resp BP SpO2   36.6 °C (97.9 °F) -- -- -- 97 %      Temp src Heart Rate Source Patient Position BP Location FiO2 (%) (Set)   -- -- -- -- --       Physical Exam  Constitutional:       Appearance: Normal appearance.   Musculoskeletal:         General: Swelling and tenderness present.      Right ankle: Swelling present. Tenderness present. Normal range of motion.      Left ankle: Normal.      Right foot: Normal range of motion. Swelling and tenderness present. No bony tenderness.      Left foot: Normal.   Neurological:      Mental Status: He is alert.           Procedures  Procedures    UC Course       Medical Decision Making  Less concerning for gout, more for tendonitis based on the lateral presentation of pain, suspicious for peroneus brevis involvement.   Recommending RICE & NSAIDs avoidance until steroid  completed.   Recommending Orthopedic specialist if symptoms persist/worsen.                        Julio C Treadwell,   07/16/24 3802

## 2024-07-13 NOTE — DISCHARGE INSTRUCTIONS
Ice the injured area to help reduce pain and swelling. Wrap a cold source (recommending a bag of frozen peas or frozen corn) in a thin towel. Apply to the injured area for 20 minutes every 1 to 2 hours the first day. Continue this 3 to 4 times a day until the pain and swelling goes away.     Elevate injured body part(s) above the level of the heart.     Recommending ice bucket; place affected foot and ankle in a bucket of ice water, 15 min in, 45 mins out for additional relief.     Remember,  Ice is Nice, that's why they rhyme, and you can use it ALL the time : )     __________________________________________________________     **Avoid applying heat**   __________________________________________________________    Refrain from taking NSAIDs (ie-Ibuprofen/Motrin/Advil, meloxicam, diclofenac or naproxen/Alleve) until Steroid course is completed. You may take Tylenol while taking your steroid.     AFTER COMPLETING YOUR STEROID COURSE,   For pain:    You may alternate Ibuprofen with Tylenol every 3-4 hours. (Ex: Ibuprofen at 8am, Tylenol at 11am, Ibuprofen at 2pm, etc) as needed.   Ibuprofen 600mg every 6hr, Always with food   (Max daily dose not to exceed 2400mg!)  Tylenol 500mg doses every 4hrs OR 650mg doses every 6hrs (Max daily dose not to exceed 3000mg!)  __________________________________________________________     If symptoms persist or worsen, consider following up with Primary Care Provider and/or an Orthopedic specialist.    __________________________________________________________    Please let us know about your experience today!   Your input is truly appreciated and helps Dr. Treadwell and your team at [facility] to continue to provide a superior level of service!   Get Well Soon!

## 2024-07-16 ASSESSMENT — ENCOUNTER SYMPTOMS
CHILLS: 0
FEVER: 0
FATIGUE: 0
JOINT SWELLING: 1
ARTHRALGIAS: 1

## 2025-06-18 ENCOUNTER — HOSPITAL ENCOUNTER (EMERGENCY)
Facility: HOSPITAL | Age: 55
Discharge: PSYCHIATRIC OR SUBSTANCE USE FACILITY | End: 2025-06-19
Attending: EMERGENCY MEDICINE | Admitting: EMERGENCY MEDICINE
Payer: COMMERCIAL

## 2025-06-18 DIAGNOSIS — F29 PSYCHOSIS, UNSPECIFIED PSYCHOSIS TYPE (CMS/HCC): Primary | ICD-10-CM

## 2025-06-18 PROBLEM — F31.5 BIPOLAR I DISORDER, MOST RECENT EPISODE DEPRESSED, SEVERE WITH PSYCHOTIC FEATURES (CMS/HCC): Status: ACTIVE | Noted: 2025-06-18

## 2025-06-18 LAB
ALBUMIN SERPL-MCNC: 4.6 G/DL (ref 3.5–5.7)
ALP SERPL-CCNC: 67 IU/L (ref 34–125)
ALT SERPL-CCNC: 34 IU/L (ref 7–52)
AMPHET UR QL SCN: NOT DETECTED
ANION GAP SERPL CALC-SCNC: 10 MEQ/L (ref 3–15)
APAP SERPL-MCNC: <0.1 UG/ML (ref 10–30)
AST SERPL-CCNC: 20 IU/L (ref 13–39)
BARBITURATES UR QL SCN: NOT DETECTED
BASOPHILS # BLD: 0.04 K/UL (ref 0.01–0.1)
BASOPHILS NFR BLD: 0.3 %
BENZODIAZ UR QL SCN: NOT DETECTED
BILIRUB SERPL-MCNC: 0.8 MG/DL (ref 0.3–1.2)
BUN SERPL-MCNC: 16 MG/DL (ref 7–25)
CALCIUM SERPL-MCNC: 9.8 MG/DL (ref 8.6–10.3)
CANNABINOIDS UR QL SCN: NOT DETECTED
CHLORIDE SERPL-SCNC: 106 MEQ/L (ref 98–107)
CO2 SERPL-SCNC: 25 MEQ/L (ref 21–31)
COCAINE UR QL SCN: NOT DETECTED
CREAT SERPL-MCNC: 1.4 MG/DL (ref 0.7–1.3)
DIFFERENTIAL METHOD BLD: ABNORMAL
EGFRCR SERPLBLD CKD-EPI 2021: 58.6 ML/MIN/1.73M*2
EOSINOPHIL # BLD: 0.03 K/UL (ref 0.04–0.54)
EOSINOPHIL NFR BLD: 0.3 %
ERYTHROCYTE [DISTWIDTH] IN BLOOD BY AUTOMATED COUNT: 13.4 % (ref 11.6–14.4)
ETHANOL SERPL-MCNC: <10 MG/DL
FENTANYL CTO UR SCN-MCNC: NOT DETECTED NG/ML
GLUCOSE SERPL-MCNC: 122 MG/DL (ref 70–99)
HCT VFR BLD AUTO: 49.2 % (ref 40.1–51)
HGB BLD-MCNC: 16.5 G/DL (ref 13.7–17.5)
IMM GRANULOCYTES # BLD AUTO: 0.07 K/UL (ref 0–0.08)
IMM GRANULOCYTES NFR BLD AUTO: 0.6 %
LYMPHOCYTES # BLD: 0.98 K/UL (ref 1.2–3.5)
LYMPHOCYTES NFR BLD: 8.5 %
MAGNESIUM SERPL-MCNC: 2.1 MG/DL (ref 1.8–2.5)
MCH RBC QN AUTO: 29.7 PG (ref 28–33.2)
MCHC RBC AUTO-ENTMCNC: 33.5 G/DL (ref 32.2–36.5)
MCV RBC AUTO: 88.5 FL (ref 83–98)
MONOCYTES # BLD: 0.86 K/UL (ref 0.3–1)
MONOCYTES NFR BLD: 7.5 %
NEUTROPHILS # BLD: 9.5 K/UL (ref 1.7–7)
NEUTS SEG NFR BLD: 82.8 %
NRBC BLD-RTO: 0 %
OPIATES UR QL SCN: NOT DETECTED
PCP UR QL SCN: NOT DETECTED
PLATELET # BLD AUTO: 231 K/UL (ref 150–350)
PMV BLD AUTO: 9.4 FL (ref 9.4–12.4)
POTASSIUM SERPL-SCNC: 4 MEQ/L (ref 3.5–5.1)
PROT SERPL-MCNC: 7.7 G/DL (ref 6–8.2)
RBC # BLD AUTO: 5.56 M/UL (ref 4.5–5.8)
SALICYLATES SERPL-MCNC: <1.5 MG/DL
SARS-COV-2 AG RESP QL IA.RAPID: NORMAL
SODIUM SERPL-SCNC: 141 MEQ/L (ref 136–145)
WBC # BLD AUTO: 11.48 K/UL (ref 3.8–10.5)

## 2025-06-18 PROCEDURE — 80307 DRUG TEST PRSMV CHEM ANLYZR: CPT | Performed by: EMERGENCY MEDICINE

## 2025-06-18 PROCEDURE — 85025 COMPLETE CBC W/AUTO DIFF WBC: CPT | Performed by: EMERGENCY MEDICINE

## 2025-06-18 PROCEDURE — 87426 SARSCOV CORONAVIRUS AG IA: CPT | Performed by: EMERGENCY MEDICINE

## 2025-06-18 PROCEDURE — 36415 COLL VENOUS BLD VENIPUNCTURE: CPT | Performed by: EMERGENCY MEDICINE

## 2025-06-18 PROCEDURE — 96372 THER/PROPH/DIAG INJ SC/IM: CPT | Mod: 59

## 2025-06-18 PROCEDURE — 93005 ELECTROCARDIOGRAM TRACING: CPT | Performed by: EMERGENCY MEDICINE

## 2025-06-18 PROCEDURE — G0480 DRUG TEST DEF 1-7 CLASSES: HCPCS | Performed by: EMERGENCY MEDICINE

## 2025-06-18 PROCEDURE — 3E033GC INTRODUCTION OF OTHER THERAPEUTIC SUBSTANCE INTO PERIPHERAL VEIN, PERCUTANEOUS APPROACH: ICD-10-PCS | Performed by: EMERGENCY MEDICINE

## 2025-06-18 PROCEDURE — 80053 COMPREHEN METABOLIC PANEL: CPT | Performed by: EMERGENCY MEDICINE

## 2025-06-18 PROCEDURE — 83735 ASSAY OF MAGNESIUM: CPT | Performed by: EMERGENCY MEDICINE

## 2025-06-18 PROCEDURE — 90792 PSYCH DIAG EVAL W/MED SRVCS: CPT | Mod: 95 | Performed by: PSYCHIATRY & NEUROLOGY

## 2025-06-18 PROCEDURE — 25000000 HC PHARMACY GENERAL: Performed by: EMERGENCY MEDICINE

## 2025-06-18 PROCEDURE — 63600000 HC DRUGS/DETAIL CODE: Performed by: EMERGENCY MEDICINE

## 2025-06-18 PROCEDURE — 99285 EMERGENCY DEPT VISIT HI MDM: CPT | Mod: 25

## 2025-06-18 PROCEDURE — 3E023GC INTRODUCTION OF OTHER THERAPEUTIC SUBSTANCE INTO MUSCLE, PERCUTANEOUS APPROACH: ICD-10-PCS | Performed by: EMERGENCY MEDICINE

## 2025-06-18 PROCEDURE — 96374 THER/PROPH/DIAG INJ IV PUSH: CPT

## 2025-06-18 RX ORDER — OLANZAPINE 5 MG/1
5 TABLET, ORALLY DISINTEGRATING ORAL NIGHTLY
Status: DISCONTINUED | OUTPATIENT
Start: 2025-06-18 | End: 2025-06-19 | Stop reason: HOSPADM

## 2025-06-18 RX ORDER — DIAZEPAM 10 MG/2ML
5 INJECTION INTRAMUSCULAR ONCE
Status: COMPLETED | OUTPATIENT
Start: 2025-06-18 | End: 2025-06-18

## 2025-06-18 RX ADMIN — OLANZAPINE 10 MG: 10 INJECTION, POWDER, FOR SOLUTION INTRAMUSCULAR at 15:48

## 2025-06-18 RX ADMIN — ZIPRASIDONE MESYLATE 10 MG: 20 INJECTION, POWDER, LYOPHILIZED, FOR SOLUTION INTRAMUSCULAR at 19:56

## 2025-06-18 RX ADMIN — DIAZEPAM 5 MG: 10 INJECTION, SOLUTION INTRAMUSCULAR; INTRAVENOUS at 19:53

## 2025-06-18 NOTE — ED PROVIDER NOTES
Emergency Medicine Note  HPI   HISTORY OF PRESENT ILLNESS     Pt presents to ED as 302 by PSP for manic behavior. Pt has had Scientologist preoccupation, flight of ideas and pressured speech. Pt denies chest pain, abd pain, sob,  suicidal ideations/homicidal ideations.          Patient History   PAST HISTORY     Reviewed from Nursing Triage:       No past medical history on file.    No past surgical history on file.    Family History   Problem Relation Name Age of Onset   • No Known Problems Biological Mother     • Diabetes Biological Father     • No Known Problems Maternal Grandmother     • No Known Problems Maternal Grandfather     • No Known Problems Paternal Grandmother     • No Known Problems Paternal Grandfather         Social History     Tobacco Use   • Smoking status: Never   • Smokeless tobacco: Never   Vaping Use   • Vaping status: Never Used   Substance Use Topics   • Alcohol use: Yes   • Drug use: Never         Review of Systems   REVIEW OF SYSTEMS     Review of Systems   Unable to perform ROS: Psychiatric disorder         VITALS     ED Vitals      Date/Time Temp Pulse Resp BP SpO2 [facility]   06/18/25 2113 36.4 °C (97.5 °F) 98 16 134/82 98 % WSK   06/18/25 1807 36.5 °C (97.7 °F) 97 16 136/88 97 % BM   06/18/25 1800 36.5 °C (97.7 °F) 65 12 136/90 96 % NE   06/18/25 1519 36.8 °C (98.3 °F) 107 18 138/89 100 % OSCAR          Pulse Ox %: 100 % (06/18/25 1529)  Pulse Ox Interpretation: Normal (06/18/25 1529)  Heart Rate: 96 (06/18/25 1529)  Rhythm Strip Interpretation: Normal Sinus Rhythm (06/18/25 1529)     Physical Exam   PHYSICAL EXAM     Physical Exam  Vitals and nursing note reviewed.   Constitutional:       Appearance: Normal appearance. He is not diaphoretic.   HENT:      Head: Normocephalic and atraumatic.      Mouth/Throat:      Mouth: Mucous membranes are moist.      Pharynx: Oropharynx is clear.   Eyes:      Extraocular Movements: Extraocular movements intact.   Cardiovascular:      Rate and Rhythm: Normal  rate and regular rhythm.   Pulmonary:      Effort: Pulmonary effort is normal.      Breath sounds: Normal breath sounds.   Abdominal:      Palpations: Abdomen is soft.      Tenderness: There is no abdominal tenderness. There is no guarding or rebound.   Musculoskeletal:         General: No swelling or tenderness. Normal range of motion.      Cervical back: Normal range of motion and neck supple.   Skin:     General: Skin is warm and dry.      Capillary Refill: Capillary refill takes less than 2 seconds.   Neurological:      General: No focal deficit present.      Mental Status: He is alert.      Motor: No weakness.           PROCEDURES     Procedures     DATA     Results       Procedure Component Value Units Date/Time    Magnesium [[id number]]  (Normal) Collected: 06/18/25 1532    Specimen: Blood, Venous Updated: 06/18/25 2237     Magnesium 2.1 mg/dL     Urine drug screen (UDS) [[id number]]  (Normal) Collected: 06/18/25 2014    Specimen: Urine, Clean Catch Updated: 06/18/25 2048     PCP Scrn, Ur Not Detected     Comment: Assay Detects: phencyclidine in urine. Lowest detectable concentration is 25 ng/mL of phencyclidine.        Benzodiazepine Ur Qual Not Detected     Comment: Assay Detects: benzodiazepines and metabolites at varying concentrations. Lowest detectable concentration is 200 ng/mL of oxazepam.        Cocaine Screen, Urine Not Detected     Comment: Assay Detects: benzoylecgonine and cocaine in urine. Lowest detectable concentration is 300 ng/mL of benzoylecgonine.        Amphetamine+Methamphetamine Screen, Ur Not Detected     Comment: Assay Detects: d-methamphetamine, d-amphetamine, methlyenedioxyamphetamine (MDA), and methlyenendioxymethamphetamine (MDMA) in urine. Lowest detectable concentration is 1000 ng/mL of d-methamphetamine.<br>Assay is less sensitive to MDA and MDMA (lowest detectable concentration, 2500 ng/mL) and could produce a false negative result. If MDMA overdose is suspected and the result  is negative, a more specific test should be requested.        Cannabinoid Screen, Urine Not Detected     Comment: Assay Detects: cannabinoid metabolites in urine. Lowest detectable concentration is 50 ng/mL        Opiate Scrn, Ur Not Detected     Comment: Assay Detects: codeine, dihydrocodeine, hydrocodone, hydromorphone, levorphanol, morphine, morphine-3-glucuronide, norcodeine, oxycodone in urine. Lowest detectable concentration is 300 ng/mL of morphine.        Barbiturate Screen, Ur Not Detected     Comment: Assay Detects: alphenal, amobarbital, aprobarbital, barbital, butabarbital, butalbital, butethal, diallybarbital, pentobarbital, secobarbital,talbutal, and thiopental. Lowest detectable concentration is 200 ng/mL of secobarbital.        Fentanyl Screen, Urine Not Detected     Comment: Assay Detects: fentanyl metabolite in urine, lowest detectable concentration is 5 ng/mL of norfentanyl.       SARS-COV-2, ANTIGEN Nares [[id number]]  (Normal) Collected: 06/18/25 1806    Specimen: Nasal Swab from Nares Updated: 06/18/25 1842     SARS-COV-2 (COVID-19), Antigen Presumptive Negative, no Ag detected    Narrative:      This test is approved by FDA under EUA use. The performance of this test is not validated for asymptomatic patients and test results should be correlated with patient's condition.        ER toxicology screen, serum [[id number]]  (Abnormal) Collected: 06/18/25 1532    Specimen: Blood, Venous Updated: 06/18/25 1614     Salicylate <1.5 mg/dL      Acetaminophen <0.1 ug/mL      Ethanol <10 mg/dL     Comprehensive metabolic panel [[id number]]  (Abnormal) Collected: 06/18/25 1532    Specimen: Blood, Venous Updated: 06/18/25 1609     Sodium 141 mEQ/L      Potassium 4.0 mEQ/L      Comment: Results obtained on plasma. Plasma Potassium values may be up to 0.4 mEQ/L less than serum values. The differences may be greater for patients with high platelet or white cell counts.        Chloride 106 mEQ/L      CO2 25 mEQ/L       BUN 16 mg/dL      Creatinine 1.4 mg/dL      Glucose 122 mg/dL      Calcium 9.8 mg/dL      AST (SGOT) 20 IU/L      ALT (SGPT) 34 IU/L      Alkaline Phosphatase 67 IU/L      Total Protein 7.7 g/dL      Comment: Test performed on plasma which typically contains approximately 0.4 g/dL more protein than serum.        Albumin 4.6 g/dL      Bilirubin, Total 0.8 mg/dL      eGFR 58.6 mL/min/1.73m*2      Comment: Calculation based on the Chronic Kidney Disease Epidemiology Collaboration (CKD-EPI) equation refit without adjustment for race.        Anion Gap 10 mEQ/L     CBC and differential [[id number]]  (Abnormal) Collected: 06/18/25 1532    Specimen: Blood, Venous Updated: 06/18/25 1557     WBC 11.48 K/uL      RBC 5.56 M/uL      Hemoglobin 16.5 g/dL      Hematocrit 49.2 %      MCV 88.5 fL      MCH 29.7 pg      MCHC 33.5 g/dL      RDW 13.4 %      Platelets 231 K/uL      MPV 9.4 fL      Differential Type Auto     nRBC 0.0 %      Immature Granulocytes 0.6 %      Neutrophils 82.8 %      Lymphocytes 8.5 %      Monocytes 7.5 %      Eosinophils 0.3 %      Basophils 0.3 %      Immature Granulocytes, Absolute 0.07 K/uL      Neutrophils, Absolute 9.50 K/uL      Lymphocytes, Absolute 0.98 K/uL      Monocytes, Absolute 0.86 K/uL      Eosinophils, Absolute 0.03 K/uL      Basophils, Absolute 0.04 K/uL             Imaging Results    None         ECG 12 lead          Scoring tools                                  ED Course & MDM   MDM / ED COURSE / CLINICAL IMPRESSION / DISPO     Medical Decision Making  Amount and/or Complexity of Data Reviewed  Labs: ordered. Decision-making details documented in ED Course.  ECG/medicine tests: ordered. Decision-making details documented in ED Course.    Risk  Prescription drug management.  Decision regarding hospitalization.        ED Course as of 06/19/25 0812   Wed Jun 18, 2025   1545 EKG: NSR @ 96 bpm, nml pr interval, nml axis, nml qrs interval, nml st-t waves [EW]   1546 Pt with increased  agitation per nursing, IM zyprexa ordered [EW]   2105 Labs unremarkable. Pt medically cleared for psychiatric evaluation [EW]   2233 Pt seen by telepsych who will uphold 302. Pt waiting for psych placement [EW]   Thu Jun 19, 2025   0812 Patient without overnight events.    Accepted at [facility] at 830AM.   [BL]      ED Course User Index  [BL] Demond Douglas MD  [EW] Clifford Juarez MD     Clinical Impression      Psychosis, unspecified psychosis type (CMS/HCC)     _________________       ED Disposition   Transfer to Behavioral Health                       Clifford Juarez MD  06/18/25 3496
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
## 2025-06-18 NOTE — LETTER
Coverage Information (for Hospital Account #[id number])    F/O Payor/Plan Precert #   IBC/PERSONAL CHOICE    Subscriber Subscriber #   Parvez Salguero [id number]   Address Phone   [address]  [place] [address] [phone]

## 2025-06-18 NOTE — BEHAVIORAL HEALTH CRISIS PROGRESS NOTE
4:35PM  Acknowledged receipt of consult. Pt is presenting on 302 petitioned by wife. Requested to be alerted when the patient is medically cleared for Part 6 assessment.    4:48PM  Alerted by Dr. Juarez that patient is otherwise medically cleared pending UDS. Dr. Juarez shared patient UDS may show contributing factors for the patient's behaviors and will wait to re-assess once UDS has resulted.     The patient does not appear to understand his rights. 302 Parts 1 to 5 completed and on chart.

## 2025-06-19 VITALS
HEART RATE: 98 BPM | OXYGEN SATURATION: 100 % | WEIGHT: 220 LBS | SYSTOLIC BLOOD PRESSURE: 159 MMHG | BODY MASS INDEX: 31.5 KG/M2 | RESPIRATION RATE: 18 BRPM | DIASTOLIC BLOOD PRESSURE: 94 MMHG | TEMPERATURE: 97.9 F | HEIGHT: 70 IN

## 2025-06-19 LAB
ATRIAL RATE: 96
P AXIS: 32
PR INTERVAL: 138
QRS DURATION: 86
QT INTERVAL: 340
QTC CALCULATION(BAZETT): 429
R AXIS: 11
T WAVE AXIS: 7
VENTRICULAR RATE: 96

## 2025-06-19 PROCEDURE — 63700000 HC SELF-ADMINISTRABLE DRUG: Performed by: EMERGENCY MEDICINE

## 2025-06-19 RX ORDER — OLANZAPINE 5 MG/1
5 TABLET, ORALLY DISINTEGRATING ORAL ONCE
Status: COMPLETED | OUTPATIENT
Start: 2025-06-19 | End: 2025-06-19

## 2025-06-19 RX ADMIN — OLANZAPINE 5 MG: 5 TABLET, ORALLY DISINTEGRATING ORAL at 07:16

## 2025-06-19 NOTE — BEHAVIORAL HEALTH CRISIS PROGRESS NOTE
Adult Bed Search  [facility] [phone]  - Can review for d/c bed  [place] [phone] -  [place] [phone], Fax: [phone]  [place] [phone] - No   [place] [phone] - No overnight admissions -  [place] [phone] -  [place] [phone] -   [place]  [phone] -   [place]  [phone] - [place]- no acute male beds  [facility] [phone] -  [facility] [phone] -  [place] [phone] - No   [facility] [phone] -  [place] [phone]- No  -  [facility] [phone]- No  -    Clinicals faxed to [facility].    06/18/2025 11:54 PM  Completed 302 faxed to [facility].  Completed 302 scanned and emailed to [email or web address]    06/19/2025 01:02 AM  [facility]- Accepted by Dr. Akers, can arrive After 10 AM.  Trip #[id number]- requested ETA [id number] for an arrival to [facility] after 1000.

## 2025-06-19 NOTE — CONSULTS
Psychiatry Consult        A video telemedicine visit was conducted.              PSYCHIATRY CONSULT NOTE: INITIAL EVALUATION    Date/Time: 2025 9:00:08 PM  Name: Parvez Salguero  : 1970  Location of the patient: [facility] ED  Consulting Array Clinician: Ceci Barr  Location of the clinician: [place], [place]  Length of Consult: 40 minutes      SUMMARY  55-year-old male, with history of psychotic disorder, bipolar disorder, history of poor self-care, history of psychiatric hospitalization, with no current excessive drug use, with unknown history of self-harming/suicidal behavior/violent behavior. Patient is a 55 year old male who resides in a single family home in [place] with wife. He has a past psychiatric history significant for bipolar disorder, with psychosis, prior inpatient psychiatric hospitalizations, no prior suicide attempts, no history of non suicidal self injurious behaviors, and no prior chemical dependency treatment. He has a past medical history significant for hyperlipidemia, obesity, hypertension, and gout. Patient presented to the emergency room with increasingly manic behavior, delusional and paranoid in thought content on 302 documented initiated by his wife. Psychiatry was consulted for evaluation of part six. Patient is responding to internal stimuli, is disorganized in thought processes, paranoid and delusional in thought content. Patient has notable decline in activities of daily living.Patient is at elevated risk of danger due to grave disability/poor self-care. Patient presently meets criteria for inpatient psychiatric hospitalization.    Working Diagnoses:  F31.5 Bipolar disorder, current episode depressed, severe, with psychotic features  Rule Out Diagnoses:     CPT Codes: 64605 - Psychiatric Diagnostic Evaluation with Medical Services    PLAN  Disposition: Involuntary admission when medically stable, 302 part six completed by this  provider and uploaded to access center for faxing     Observation level - Psychiatric 1:1 needed? Continue psych 1:1 OR Close observation per hospital protocol  Work-up:   Pharmacological:   Initiate olanzapine 5 mg at bedtime for psychosis  Is patient psychotic? - Yes; Were antipsychotic medications started? - Yes  Informed consent: Patient is unable to understand risks benefits of or consent to above recommended psychiatric medications in their current mental state. No surrogate decision maker is available. Without recommended medication patient will likely deteriorate further and possibly place themselves or others at risk. Patient is not legally compelled to take recommended medication at this time.  Follow up needed while in the hospital? Q24h  Other:   If questions arise about the psychiatric care of this patient, please call the [facility] to request a follow-up consult.  Please do not contact me individually through the EMR chat as I am not regularly logged on to this system. The psychiatrist for the follow-up visit may be a different psychiatrist  Discussed plan with onsite team member: Yes - This facility has a sign-out process that does not involve doc-to-doc communication, reviewed evaluation and recs with Lisa Wright LCSW    HISTORY  This evaluation was conducted remotely with the assistance of onsite staff via HIPAA-compliant video call. Patient consented to proceed with the telehealth visit.    Requested by: Emergency medicine colleagues, MD  Sources of information: Patient, medical record    History of Present Illness:   55-year-old male, living with family, , employed, with history of psychotic disorder, bipolar disorder, history of poor self-care, history of psychiatric hospitalization, with no current excessive drug use, with unknown history of self-harming/suicidal behavior/violent behavior, . Patient is a 55 year old male who resides in a single family home in [place] with  "wife. He has a past psychiatric history significant for bipolar disorder, with psychosis, prior inpatient psychiatric hospitalizations, no prior suicide attempts, no history of non suicidal self injurious behaviors, and no prior chemical dependency treatment. He has a past medical history significant for hyperlipidemia, obesity, hypertension, and gout. Patient presented to the emergency room with increasingly manic behavior, delusional and paranoid in thought content on 302 documented initiated by his wife. Psychiatry was consulted for evaluation of part six. On psychiatric interview, patient is not forthcoming, not reliable, he is rambling largely incoherently and is not able to meaningfully participate in the psychiatric interview. He does appear to be responding to internal stimuli throughout the interview. Patient is not able to articulate to this provider why he is in the hospital and does not answer questions regarding his psychotropic regimen or history. Patient does report that he needs to stay on the same \"frequency\" repeatedly to this provider..    Collateral Contacted  No-- patient meets criteria for inpatient hospitalization.    PSYCHIATRIC REVIEW OF SYSTEMS (symptoms in past two weeks)  Pertinent Positives: irritability/paranoia/ideas of reference/disordered thinking/impulsivity  Pertinent Negatives: no depressed mood/no anhedonia/no hopelessness/no insomnia/no aggressive behavior/no agitation/no command hallucinations/no confusion/no anxiety/no panic attacks    PSYCHIATRIC HISTORY  Past Psychiatric Diagnoses/Problems: psychotic disorder, bipolar disorder  Psychiatric Treatment:      Hospitalizations: psychiatric hospitalization     Other Past treatment: medication management     Current treatment: medication management, follows with outpatient psychiatrist in the community  Drug/Alcohol History     Current excessive drug/alcohol use: none     Past excessive drug/alcohol use: none     Drug/alcohol use " comment:      Treatment: none     Withdrawal symptoms: none     UDS results:      BAL results:      Active withdrawal Protocol:   Stressors: exacerbation of mental illness  Trauma: unknown  Family Psychiatric History: unknown    HEALTH HISTORY  Medical Problems:  deemed medically stable  Is patient linked with PCP? unknown  Psychiatric and other clinically relevant medications:   Allergies/Adverse Medication Reactions: allopurinol  Physical Findings: no clinically significant abnormal lab values    DEMOGRAPHICS/SOCIAL HISTORY  Gender: male  Living Situation: living with family  Relationship Status:   Education: high school/GED  Employment: employed  Social Support Network: supportive social network of family or friends  Legal History: none  Special Considerations:     RISK EVALUATION  Suicidality/self-injury: unable to assess      Primary Suicide Screening (PSS-3)  1. In the past two weeks, have you felt down, depressed, or hopeless? NO  2. In the past two weeks, have you had thoughts of killing yourself? Unable to assess  3. In your lifetime, have you ever attempted to kill yourself? Unable to assess  3a. Within the past 6 months? Unable to assess    ESS-6 Secondary Screen  ( If #2 is yes or #3a is yes within the past 6 months, then complete secondary screen)  1. Positive on PSS-3 questions 2 & 3 - active suicidal ideation with a past attempt? Screen not applicable  2. Have you been thinking about how you might kill yourself? Screen not applicable  3. Have you had some intention of acting on your thoughts? Screen not applicable  4. Lifetime psychiatric hospitalization? Screen not applicable  5. Has drinking or substance abuse ever been a problem for you? Screen not applicable  6. Current irritability, agitation, or aggression? Screen not applicable    PSS-3/ESS-6 Secondary Screen Scoring: PSS-3 screen unable to assess      PSS-3/ESS-6 Scoring Interpretation Legend  PSS-3 screen incomplete [Blank PSS-3  questions #2 OR #3a]  PSS-3 screen unable to assess [Unable to Assess responses on PSS-3 questions #2 AND #3a]  Mild [No current attempt AND No suicide plan or intent AND Score (0-2)]  Moderate [No current attempt AND Active suicidal ideation with plan or intent (not both) OR Score (3-4)]  Severe [Current attempt OR Suicide plan and intent OR Score (5-6)]      HI/Violence/Property Destruction: unable to assess  Access to Firearms: unable to assess  Grave disability/Poor self-care: yes poor self-care, medical neglect  Psychosis: Yes  Protective Factors:   High Utilization Criteria:   Signs of Secondary Gain:     MENTAL STATUS EXAM  Appearance and Attire: poor level of hygiene and self grooming  Psychomotor agitation: calm kinetics  Attitude and behavior: not forthcoming, not reliable  Speech: rambling largely incoherently  Mood:  Depressed  Affect: congruent with mood  Thought Process: significant loosening of associations, highly disorganized in thought processes  Thought content: delusional and paranoid  Perception: did appear to be responding to internal stimuli  Intelligence:  Average  Abstraction:   Language:  No abnormality  Orientation:  Grossly oriented  Sensorium:  Distractible  Knowledge:  Appropriate for education and socioeconomic status  Memory:  Impaired to Executive function  Insight: poor insight  Judgment: poor judgment    SUMMARY RISK ASSESSMENT  Current Suicide Risk Elevated? PSS-3/ESS-6 Scoring: PSS-3 screen unable to assess   Current Violence Risk Elevated? No  Issues with ability to care for self. Yes, decline in activities of daily living    SAFE-T    Risk Factors  Suicidal Behavior:   [] History of prior suicide attempts  [] Aborted suicide attempt  [] History of prior SI  [] Self-injurious behavior    Current/Past Psychiatric Disorders:   [x]Mood disorders  [x] Psychotic Disorders  [x] History of inpatient hospitalization  [] ADHD  [] TBI  [] PTSD  [] Cluster B personality disorders  []  Conduct disorders  [] Medical comorbidity  [] Recent onset of illness    Current/Past Substance Use:   [] Active ETOH/Opiates/Other Substance abuse  [] History of ETOH/Opiates/Other Substance abuse  [] Active withdrawal or risk of withdrawal from ETOH/Opiate    Key Symptoms:   [] Anhedonia  [x] Impulsivity  [] Hopelessness  [] Anxiety/Panic  [] Global insomnia (difficulty falling asleep, maintaining sleep, or falling back to sleep)  [] Command Hallucinations    Family History Risk Factors:   [] Suicide Attempts  [] Psychiatric disorders requiring hospitalization  [] Suicidal Behavior    Precipitants/Stressors/Interpersonal/Triggers:   [] Events leading to humiliation, shame, or despair  [] Family turmoil/chaos  [] Chronic physical pain or other acute medical problems  [] Perceived burden on others  [] Ongoing medical illness  [] History of physical or sexual abuse  [] Legal problems  [] Intoxication  [] Social isolation  [] Inadequate social support    Treatment:   [x] Medication management  [] Therapy  [] Satisfied with current treatment  [] Recent discharge from a psychiatric hospital  [] Recent change in provider or treatment    [] Access to firearms/ammunition    Protective Factors  Internal:   [] Ability to cope with stress  [] Identifies reasons for living  [] Frustration tolerance  [] Mandaeism beliefs  [] Fear of death or the actual act of killing self    External:   [] Cultural factors against suicide  [] Beloved pets  [] Engaged in work or school  [] Spiritual and/or moral attitudes against suicide  [x] Supportive social network of family or friends  [] Responsibility to children/others  [] Positive therapeutic relationships    Ceci Barr DO

## 2025-06-19 NOTE — CONSULTS
"Patient Information    Patient Name   Parvez Salguero    Address   [address] [facility] [address]    Race   White    [facility]                  Patient Legal Name   Parvez Salguero    Legal Sex   Male    Date of Birth   1970                    Room   AC21    Ethnic Group   Not , /a, or Kinyarwanda origin    Language   English                    MRN   [id number]    Phone Numbers   Hm: [phone] Cell: [phone]    PCP   Christos Amin, DO                      Emergency Contacts    Name Relation Home Work Mobile   Sarah Salguero Spouse   [phone]     Height and Weight    BMI Height Recorded Weight   31.57 kg/m² Important  177.8 cm (5' 10\") 99.8 kg (220 lb)     Patient Information       Patient Name  Parvez Salguero MRN  [id number] Legal Sex  Male  Age  1970 (55 y.o.) [facility]             Admit Date Department Dept Phone    [address] [facility] [phone]          Alcohol Use       Yes.          Tobacco Use       Never smoked or used smokeless tobacco.          Vaping Use       Never used           Problem List  Current as of 257             CANDELARIO (acute kidney injury) (CMS/HCC) Benign essential HTN    Bipolar I disorder, most recent episode depressed, severe with psychotic features (CMS/HCC) Idiopathic chronic gout of multiple sites without tophus    Mixed hyperlipidemia Obesity (BMI 30.0-34.9)    Psychosis (CMS/HCC)           Allergies    Allopurinol       Results (last 24 hours)       Procedure Component Value Units Date/Time    Urine drug screen (UDS) [[id number]]  (Normal) Collected: 25    Order Status: Completed Specimen: Urine, Clean Catch Updated: 25     PCP Scrn, Ur Not Detected     Benzodiazepine Ur Qual Not Detected     Cocaine Screen, Urine Not Detected     Amphetamine+Methamphetamine Screen, Ur Not Detected     Cannabinoid Screen, Urine Not Detected     Opiate Scrn, Ur Not Detected     Barbiturate Screen, Ur Not " Detected     Fentanyl Screen, Urine Not Detected    ER toxicology screen, serum [[id number]]  (Abnormal) Collected: 06/18/25 1532    Order Status: Completed Specimen: Blood, Venous Updated: 06/18/25 1614     Salicylate <1.5 mg/dL      Acetaminophen <0.1 ug/mL      Ethanol <10 mg/dL     Comprehensive metabolic panel [[id number]]  (Abnormal) Collected: 06/18/25 1532    Order Status: Completed Specimen: Blood, Venous Updated: 06/18/25 1609     Sodium 141 mEQ/L      Potassium 4.0 mEQ/L      Chloride 106 mEQ/L      CO2 25 mEQ/L      BUN 16 mg/dL      Creatinine 1.4 mg/dL      Glucose 122 mg/dL      Calcium 9.8 mg/dL      AST (SGOT) 20 IU/L      ALT (SGPT) 34 IU/L      Alkaline Phosphatase 67 IU/L      Total Protein 7.7 g/dL      Albumin 4.6 g/dL      Bilirubin, Total 0.8 mg/dL      eGFR 58.6 mL/min/1.73m*2      Anion Gap 10 mEQ/L     CBC and differential [[id number]]  (Abnormal) Collected: 06/18/25 1532    Order Status: Completed Specimen: Blood, Venous Updated: 06/18/25 1557     WBC 11.48 K/uL      RBC 5.56 M/uL      Hemoglobin 16.5 g/dL      Hematocrit 49.2 %      MCV 88.5 fL      MCH 29.7 pg      MCHC 33.5 g/dL      RDW 13.4 %      Platelets 231 K/uL      MPV 9.4 fL      Differential Type Auto     nRBC 0.0 %      Immature Granulocytes 0.6 %      Neutrophils 82.8 %      Lymphocytes 8.5 %      Monocytes 7.5 %      Eosinophils 0.3 %      Basophils 0.3 %      Immature Granulocytes, Absolute 0.07 K/uL      Neutrophils, Absolute 9.50 K/uL      Lymphocytes, Absolute 0.98 K/uL      Monocytes, Absolute 0.86 K/uL      Eosinophils, Absolute 0.03 K/uL      Basophils, Absolute 0.04 K/uL           Medical History            No past medical history on file.          Surgical History            No past surgical history on file.          Employment History       No employment history on file.          Family and Education       Marital Status              Social Identity       Preferred Language Ethnicity Race    English Not  , /a, or South African origin White             Radiology Results (last 24 hours)    No matching results found          ECG Results (last 24 hours)        ECG 12 lead [[id number]]  Resulted: 25 1544, Result status: Preliminary result     Ordering provider: Clifford Juarez MD  25 1529 Resulting lab: [facility]   Narrative:  Normal sinus rhythm  Normal ECG  No previous ECGs available      Components      Component Value Flag   Ventricular rate 96  --   Atrial rate 96  --   NH Interval 138  --   QRS duration 86  --   QT Interval 340  --   QTC Calculation(Bazett) 429  --   P Axis 32  --   R Axis 11  --   T Wave Axis 7  --                          Microbiology Results       Procedure Component Value Units Date/Time    SARS-COV-2, ANTIGEN Nares [[id number]]  (Normal) Collected: 25    Specimen: Nasal Swab from Nares Updated: 25     SARS-COV-2 (COVID-19), Antigen Presumptive Negative, no Ag detected    Narrative:      This test is approved by FDA under EUA use. The performance of this test is not validated for asymptomatic patients and test results should be correlated with patient's condition.              Home Medications           Taking? Start Date End Date Provider     amLODIPine (NORVASC) 5 mg tablet   23  --  ProviderLauren MD     Take 5 mg by mouth daily.     methylPREDNISolone (MEDROL DOSEPACK) 4 mg tablet   24  --  Julio C Treadwell DO     Follow package directions.     risperiDONE (RisperDAL) 1 mg tablet   23  --  ProviderLauren MD     Take 1 tablet by mouth 2 (two) times a day.          Psychiatry Consult      A video telemedicine visit was conducted.              PSYCHIATRY CONSULT NOTE: INITIAL EVALUATION    Date/Time: 2025 9:00:08 PM  Name: Parvez Salguero  : 1970  Location of the patient: [facility] ED  Consulting Array Clinician: Ceci Barr  Location of the clinician: [place]  [place]  Length of Consult: 40 minutes      SUMMARY  55-year-old male, with history of psychotic disorder, bipolar disorder, history of poor self-care, history of psychiatric hospitalization, with no current excessive drug use, with unknown history of self-harming/suicidal behavior/violent behavior. Patient is a 55 year old male who resides in a single family home in [place] with wife. He has a past psychiatric history significant for bipolar disorder, with psychosis, prior inpatient psychiatric hospitalizations, no prior suicide attempts, no history of non suicidal self injurious behaviors, and no prior chemical dependency treatment. He has a past medical history significant for hyperlipidemia, obesity, hypertension, and gout. Patient presented to the emergency room with increasingly manic behavior, delusional and paranoid in thought content on 302 documented initiated by his wife. Psychiatry was consulted for evaluation of part six. Patient is responding to internal stimuli, is disorganized in thought processes, paranoid and delusional in thought content. Patient has notable decline in activities of daily living.Patient is at elevated risk of danger due to grave disability/poor self-care. Patient presently meets criteria for inpatient psychiatric hospitalization.    Working Diagnoses:  F31.5 Bipolar disorder, current episode depressed, severe, with psychotic features  Rule Out Diagnoses:     CPT Codes: 33424 - Psychiatric Diagnostic Evaluation with Medical Services    PLAN  Disposition: Involuntary admission when medically stable, 302 part six completed by this provider and uploaded to access center for faxing     Observation level - Psychiatric 1:1 needed? Continue psych 1:1 OR Close observation per hospital protocol  Work-up:   Pharmacological:   Initiate olanzapine 5 mg at bedtime for psychosis  Is patient psychotic? - Yes; Were antipsychotic medications started? - Yes  Informed consent: Patient is  unable to understand risks benefits of or consent to above recommended psychiatric medications in their current mental state. No surrogate decision maker is available. Without recommended medication patient will likely deteriorate further and possibly place themselves or others at risk. Patient is not legally compelled to take recommended medication at this time.  Follow up needed while in the hospital? Q24h  Other:   If questions arise about the psychiatric care of this patient, please call the [facility] to request a follow-up consult.  Please do not contact me individually through the EMR chat as I am not regularly logged on to this system. The psychiatrist for the follow-up visit may be a different psychiatrist  Discussed plan with onsite team member: Yes - This facility has a sign-out process that does not involve doc-to-doc communication, reviewed evaluation and recs with Lisa Wright LCSW    HISTORY  This evaluation was conducted remotely with the assistance of onsite staff via HIPAA-compliant video call. Patient consented to proceed with the telehealth visit.    Requested by: Emergency medicine colleagues, MD  Sources of information: Patient, medical record    History of Present Illness:   55-year-old male, living with family, , employed, with history of psychotic disorder, bipolar disorder, history of poor self-care, history of psychiatric hospitalization, with no current excessive drug use, with unknown history of self-harming/suicidal behavior/violent behavior, . Patient is a 55 year old male who resides in a single family home in [place] with wife. He has a past psychiatric history significant for bipolar disorder, with psychosis, prior inpatient psychiatric hospitalizations, no prior suicide attempts, no history of non suicidal self injurious behaviors, and no prior chemical dependency treatment. He has a past medical history significant for hyperlipidemia, obesity, hypertension,  "and gout. Patient presented to the emergency room with increasingly manic behavior, delusional and paranoid in thought content on 302 documented initiated by his wife. Psychiatry was consulted for evaluation of part six. On psychiatric interview, patient is not forthcoming, not reliable, he is rambling largely incoherently and is not able to meaningfully participate in the psychiatric interview. He does appear to be responding to internal stimuli throughout the interview. Patient is not able to articulate to this provider why he is in the hospital and does not answer questions regarding his psychotropic regimen or history. Patient does report that he needs to stay on the same \"frequency\" repeatedly to this provider..    Collateral Contacted  No-- patient meets criteria for inpatient hospitalization.    PSYCHIATRIC REVIEW OF SYSTEMS (symptoms in past two weeks)  Pertinent Positives: irritability/paranoia/ideas of reference/disordered thinking/impulsivity  Pertinent Negatives: no depressed mood/no anhedonia/no hopelessness/no insomnia/no aggressive behavior/no agitation/no command hallucinations/no confusion/no anxiety/no panic attacks    PSYCHIATRIC HISTORY  Past Psychiatric Diagnoses/Problems: psychotic disorder, bipolar disorder  Psychiatric Treatment:      Hospitalizations: psychiatric hospitalization     Other Past treatment: medication management     Current treatment: medication management, follows with outpatient psychiatrist in the community  Drug/Alcohol History     Current excessive drug/alcohol use: none     Past excessive drug/alcohol use: none     Drug/alcohol use comment:      Treatment: none     Withdrawal symptoms: none     UDS results:      BAL results:      Active withdrawal Protocol:   Stressors: exacerbation of mental illness  Trauma: unknown  Family Psychiatric History: unknown    HEALTH HISTORY  Medical Problems:  deemed medically stable  Is patient linked with PCP? unknown  Psychiatric and " other clinically relevant medications:   Allergies/Adverse Medication Reactions: allopurinol  Physical Findings: no clinically significant abnormal lab values    DEMOGRAPHICS/SOCIAL HISTORY  Gender: male  Living Situation: living with family  Relationship Status:   Education: high school/GED  Employment: employed  Social Support Network: supportive social network of family or friends  Legal History: none  Special Considerations:     RISK EVALUATION  Suicidality/self-injury: unable to assess      Primary Suicide Screening (PSS-3)  1. In the past two weeks, have you felt down, depressed, or hopeless? NO  2. In the past two weeks, have you had thoughts of killing yourself? Unable to assess  3. In your lifetime, have you ever attempted to kill yourself? Unable to assess  3a. Within the past 6 months? Unable to assess    ESS-6 Secondary Screen  ( If #2 is yes or #3a is yes within the past 6 months, then complete secondary screen)  1. Positive on PSS-3 questions 2 & 3 - active suicidal ideation with a past attempt? Screen not applicable  2. Have you been thinking about how you might kill yourself? Screen not applicable  3. Have you had some intention of acting on your thoughts? Screen not applicable  4. Lifetime psychiatric hospitalization? Screen not applicable  5. Has drinking or substance abuse ever been a problem for you? Screen not applicable  6. Current irritability, agitation, or aggression? Screen not applicable    PSS-3/ESS-6 Secondary Screen Scoring: PSS-3 screen unable to assess      PSS-3/ESS-6 Scoring Interpretation Legend  PSS-3 screen incomplete [Blank PSS-3 questions #2 OR #3a]  PSS-3 screen unable to assess [Unable to Assess responses on PSS-3 questions #2 AND #3a]  Mild [No current attempt AND No suicide plan or intent AND Score (0-2)]  Moderate [No current attempt AND Active suicidal ideation with plan or intent (not both) OR Score (3-4)]  Severe [Current attempt OR Suicide plan and intent OR  Score (5-6)]      HI/Violence/Property Destruction: unable to assess  Access to Firearms: unable to assess  Grave disability/Poor self-care: yes poor self-care, medical neglect  Psychosis: Yes  Protective Factors:   High Utilization Criteria:   Signs of Secondary Gain:     MENTAL STATUS EXAM  Appearance and Attire: poor level of hygiene and self grooming  Psychomotor agitation: calm kinetics  Attitude and behavior: not forthcoming, not reliable  Speech: rambling largely incoherently  Mood:  Depressed  Affect: congruent with mood  Thought Process: significant loosening of associations, highly disorganized in thought processes  Thought content: delusional and paranoid  Perception: did appear to be responding to internal stimuli  Intelligence:  Average  Abstraction:   Language:  No abnormality  Orientation:  Grossly oriented  Sensorium:  Distractible  Knowledge:  Appropriate for education and socioeconomic status  Memory:  Impaired to Executive function  Insight: poor insight  Judgment: poor judgment    SUMMARY RISK ASSESSMENT  Current Suicide Risk Elevated? PSS-3/ESS-6 Scoring: PSS-3 screen unable to assess   Current Violence Risk Elevated? No  Issues with ability to care for self. Yes, decline in activities of daily living    SAFE-T    Risk Factors  Suicidal Behavior:   [] History of prior suicide attempts  [] Aborted suicide attempt  [] History of prior SI  [] Self-injurious behavior    Current/Past Psychiatric Disorders:   [x]Mood disorders  [x] Psychotic Disorders  [x] History of inpatient hospitalization  [] ADHD  [] TBI  [] PTSD  [] Cluster B personality disorders  [] Conduct disorders  [] Medical comorbidity  [] Recent onset of illness    Current/Past Substance Use:   [] Active ETOH/Opiates/Other Substance abuse  [] History of ETOH/Opiates/Other Substance abuse  [] Active withdrawal or risk of withdrawal from ETOH/Opiate    Key Symptoms:   [] Anhedonia  [x] Impulsivity  [] Hopelessness  [] Anxiety/Panic  []  Global insomnia (difficulty falling asleep, maintaining sleep, or falling back to sleep)  [] Command Hallucinations    Family History Risk Factors:   [] Suicide Attempts  [] Psychiatric disorders requiring hospitalization  [] Suicidal Behavior    Precipitants/Stressors/Interpersonal/Triggers:   [] Events leading to humiliation, shame, or despair  [] Family turmoil/chaos  [] Chronic physical pain or other acute medical problems  [] Perceived burden on others  [] Ongoing medical illness  [] History of physical or sexual abuse  [] Legal problems  [] Intoxication  [] Social isolation  [] Inadequate social support    Treatment:   [x] Medication management  [] Therapy  [] Satisfied with current treatment  [] Recent discharge from a psychiatric hospital  [] Recent change in provider or treatment    [] Access to firearms/ammunition    Protective Factors  Internal:   [] Ability to cope with stress  [] Identifies reasons for living  [] Frustration tolerance  [] Sabianism beliefs  [] Fear of death or the actual act of killing self    External:   [] Cultural factors against suicide  [] Beloved pets  [] Engaged in work or school  [] Spiritual and/or moral attitudes against suicide  [x] Supportive social network of family or friends  [] Responsibility to children/others  [] Positive therapeutic relationships    Ceci Barr,

## 2025-07-31 ENCOUNTER — OFFICE VISIT (OUTPATIENT)
Dept: PRIMARY CARE | Facility: CLINIC | Age: 55
End: 2025-07-31
Payer: COMMERCIAL

## 2025-07-31 VITALS
DIASTOLIC BLOOD PRESSURE: 72 MMHG | SYSTOLIC BLOOD PRESSURE: 134 MMHG | BODY MASS INDEX: 32.93 KG/M2 | OXYGEN SATURATION: 99 % | HEIGHT: 70 IN | RESPIRATION RATE: 18 BRPM | TEMPERATURE: 97.9 F | HEART RATE: 84 BPM | WEIGHT: 230 LBS

## 2025-07-31 DIAGNOSIS — F29 PSYCHOSIS, UNSPECIFIED PSYCHOSIS TYPE (CMS/HCC): ICD-10-CM

## 2025-07-31 DIAGNOSIS — E78.2 MIXED HYPERLIPIDEMIA: ICD-10-CM

## 2025-07-31 DIAGNOSIS — F31.5 BIPOLAR I DISORDER, MOST RECENT EPISODE DEPRESSED, SEVERE WITH PSYCHOTIC FEATURES (CMS/HCC): Primary | ICD-10-CM

## 2025-07-31 DIAGNOSIS — R53.82 CHRONIC FATIGUE: ICD-10-CM

## 2025-07-31 DIAGNOSIS — M1A.09X0 IDIOPATHIC CHRONIC GOUT OF MULTIPLE SITES WITHOUT TOPHUS: ICD-10-CM

## 2025-07-31 DIAGNOSIS — M16.12 ARTHRITIS OF LEFT HIP: ICD-10-CM

## 2025-07-31 PROBLEM — M70.21 OLECRANON BURSITIS OF RIGHT ELBOW: Status: ACTIVE | Noted: 2025-03-08

## 2025-07-31 PROBLEM — M10.9 ACUTE GOUT OF LEFT FOOT: Status: ACTIVE | Noted: 2025-07-31

## 2025-07-31 PROBLEM — M10.9 GOUTY ARTHRITIS OF LEFT KNEE: Status: ACTIVE | Noted: 2025-05-30

## 2025-07-31 PROCEDURE — 3078F DIAST BP <80 MM HG: CPT | Performed by: FAMILY MEDICINE

## 2025-07-31 PROCEDURE — 3075F SYST BP GE 130 - 139MM HG: CPT | Performed by: FAMILY MEDICINE

## 2025-07-31 PROCEDURE — 99214 OFFICE O/P EST MOD 30 MIN: CPT | Performed by: FAMILY MEDICINE

## 2025-07-31 PROCEDURE — 3008F BODY MASS INDEX DOCD: CPT | Performed by: FAMILY MEDICINE

## 2025-07-31 ASSESSMENT — ENCOUNTER SYMPTOMS
CHEST TIGHTNESS: 0
ACTIVITY CHANGE: 1
PALPITATIONS: 0
ARTHRALGIAS: 0
BLOOD IN STOOL: 0
EYE PAIN: 0
ABDOMINAL PAIN: 0
BACK PAIN: 0
TROUBLE SWALLOWING: 0
DECREASED CONCENTRATION: 1
DYSPHORIC MOOD: 1
NECK PAIN: 0
FREQUENCY: 0
AGITATION: 1
HYPERACTIVE: 1
NERVOUS/ANXIOUS: 1
DYSURIA: 0
SHORTNESS OF BREATH: 0
CONFUSION: 1
DIARRHEA: 0
SLEEP DISTURBANCE: 1
NAUSEA: 0
HEADACHES: 0
WEAKNESS: 0
FATIGUE: 1

## 2025-07-31 ASSESSMENT — PATIENT HEALTH QUESTIONNAIRE - PHQ9: SUM OF ALL RESPONSES TO PHQ9 QUESTIONS 1 & 2: 0

## 2025-07-31 NOTE — PROGRESS NOTES
Daily Progress Note      Subjective      Patient ID: Parvez Salguero is a 55 y.o. male.    HPI  Patient has bpd w/ psychoses. Refuses to believe or want meds, psy, or psych  For med check, diagnosis discussion  Doing well on current rx  No new c/o, compliant  Also, c/o fatigue      The following have been reviewed and updated as appropriate in this visit:   Problems       Review of Systems   Constitutional:  Positive for activity change and fatigue.   HENT:  Negative for congestion, ear pain, tinnitus and trouble swallowing.    Eyes:  Negative for pain.   Respiratory:  Negative for chest tightness and shortness of breath.    Cardiovascular:  Negative for chest pain and palpitations.   Gastrointestinal:  Negative for abdominal pain, blood in stool, diarrhea and nausea.   Genitourinary:  Negative for dysuria, frequency and urgency.   Musculoskeletal:  Negative for arthralgias, back pain and neck pain.   Neurological:  Negative for weakness and headaches.   Psychiatric/Behavioral:  Positive for agitation, behavioral problems, confusion, decreased concentration, dysphoric mood and sleep disturbance. The patient is nervous/anxious and is hyperactive.    All other systems reviewed and are negative.      No current outpatient medications on file.     No current facility-administered medications for this visit.     History reviewed. No pertinent past medical history.  Family History   Problem Relation Name Age of Onset    No Known Problems Biological Mother      Diabetes Biological Father      No Known Problems Maternal Grandmother      No Known Problems Maternal Grandfather      No Known Problems Paternal Grandmother      No Known Problems Paternal Grandfather       No past surgical history on file.  Social History     Socioeconomic History    Marital status:      Spouse name: Not on file    Number of children: Not on file    Years of education: Not on file    Highest education level: Not on file   Occupational  History    Not on file   Tobacco Use    Smoking status: Never     Passive exposure: Never    Smokeless tobacco: Never   Vaping Use    Vaping status: Never Used   Substance and Sexual Activity    Alcohol use: Yes    Drug use: Never    Sexual activity: Yes     Partners: Female     Birth control/protection: None   Other Topics Concern    Not on file   Social History Narrative    Not on file     Social Drivers of Health     Financial Resource Strain: Not on file   Food Insecurity: No Food Insecurity (6/18/2025)    Hunger Vital Sign     Worried About Running Out of Food in the Last Year: Never true     Ran Out of Food in the Last Year: Never true   Transportation Needs: Not on file   Physical Activity: Not on file   Stress: Not on file   Social Connections: Not on file   Intimate Partner Violence: Not on file   Housing Stability: Not on file     Allergies   Allergen Reactions    Allopurinol      Side effect       Objective   I have reviewed the patient's pertinent labs. Pertinent labs are within normal limits.    Physical Exam  Constitutional:       General: He is not in acute distress.     Appearance: Normal appearance. He is well-developed. He is not ill-appearing.   HENT:      Head: Normocephalic and atraumatic.      Right Ear: Tympanic membrane and external ear normal.      Left Ear: Tympanic membrane and external ear normal.      Mouth/Throat:      Mouth: Mucous membranes are moist.   Eyes:      Pupils: Pupils are equal, round, and reactive to light.   Neck:      Thyroid: No thyromegaly.   Cardiovascular:      Rate and Rhythm: Normal rate and regular rhythm.      Heart sounds: Normal heart sounds.   Pulmonary:      Effort: Pulmonary effort is normal. No respiratory distress.      Breath sounds: Normal breath sounds. No wheezing or rales.   Abdominal:      General: Bowel sounds are normal.      Palpations: Abdomen is soft. There is no mass.      Tenderness: There is no abdominal tenderness. There is no guarding or  rebound.      Hernia: No hernia is present.   Musculoskeletal:         General: No deformity. Normal range of motion.      Cervical back: Normal range of motion and neck supple.   Skin:     General: Skin is warm and dry.   Neurological:      Mental Status: He is alert and oriented to person, place, and time.      Cranial Nerves: No cranial nerve deficit.   Psychiatric:         Attention and Perception: Attention and perception normal.         Mood and Affect: Affect normal. Mood is anxious.         Speech: Speech normal.         Behavior: Behavior normal. Behavior is cooperative.         Thought Content: Thought content is paranoid and delusional.         Cognition and Memory: Cognition and memory normal.         Judgment: Judgment is impulsive.         Assessment/Plan     Problem List Items Addressed This Visit       Idiopathic chronic gout of multiple sites without tophus    Relevant Orders    Uric acid    Comprehensive metabolic panel    CBC and differential    Lipid panel    TSH w reflex FT4    PSA    Mixed hyperlipidemia    Relevant Orders    Uric acid    Comprehensive metabolic panel    CBC and differential    Lipid panel    TSH w reflex FT4    PSA    Psychosis (CMS/HCC)    Relevant Orders    Uric acid    Comprehensive metabolic panel    CBC and differential    Lipid panel    TSH w reflex FT4    PSA    Bipolar I disorder, most recent episode depressed, severe with psychotic features (CMS/HCC) - Primary    Relevant Orders    Uric acid    Comprehensive metabolic panel    CBC and differential    Lipid panel    TSH w reflex FT4    PSA     Other Visit Diagnoses         Chronic fatigue        Relevant Orders    Uric acid    Comprehensive metabolic panel    CBC and differential    Lipid panel    TSH w reflex FT4    PSA      Arthritis of left hip        Relevant Orders    Uric acid    Comprehensive metabolic panel    CBC and differential    Lipid panel    TSH w reflex FT4    PSA          Orders Placed This Encounter    Procedures    Uric acid     Standing Status:   Future     Number of Occurrences:   1     Expiration Date:   7/31/2026     Release to patient:   Immediate [1]    Comprehensive metabolic panel     Standing Status:   Future     Number of Occurrences:   1     Expiration Date:   7/31/2026     Release to patient:   Immediate [1]    CBC and differential     Standing Status:   Future     Number of Occurrences:   1     Expiration Date:   7/31/2026     Release to patient:   Immediate [1]    Lipid panel     Standing Status:   Future     Number of Occurrences:   1     Expiration Date:   7/31/2026     Release to patient:   Immediate [1]    TSH w reflex FT4     Standing Status:   Future     Number of Occurrences:   1     Expiration Date:   7/31/2026     Release to patient:   Immediate [1]    PSA     Standing Status:   Future     Number of Occurrences:   1     Expiration Date:   7/31/2026     Release to patient:   Immediate [1]     Reviewed rx, dx, tx, hx, labs  Discussed, reviewed, counseled, described at length  Decline meds, psy, help, etc  Will decline to tx further  Will check labs  Needs diet, ex, rhm uspstf  Declines      Christos Amin,   7/31/2025

## 2025-08-04 LAB
BASOPHILS # BLD AUTO: 0.1 X10E3/UL (ref 0–0.2)
BASOPHILS NFR BLD AUTO: 1 %
EOSINOPHIL # BLD AUTO: 0.2 X10E3/UL (ref 0–0.4)
EOSINOPHIL NFR BLD AUTO: 3 %
ERYTHROCYTE [DISTWIDTH] IN BLOOD BY AUTOMATED COUNT: 13.7 % (ref 11.6–15.4)
HCT VFR BLD AUTO: 42 % (ref 37.5–51)
HGB BLD-MCNC: 13.8 G/DL (ref 13–17.7)
IMM GRANULOCYTES # BLD AUTO: 0 X10E3/UL (ref 0–0.1)
IMM GRANULOCYTES NFR BLD AUTO: 0 %
LYMPHOCYTES # BLD AUTO: 1.8 X10E3/UL (ref 0.7–3.1)
LYMPHOCYTES NFR BLD AUTO: 31 %
MCH RBC QN AUTO: 29.2 PG (ref 26.6–33)
MCHC RBC AUTO-ENTMCNC: 32.9 G/DL (ref 31.5–35.7)
MCV RBC AUTO: 89 FL (ref 79–97)
MONOCYTES # BLD AUTO: 0.4 X10E3/UL (ref 0.1–0.9)
MONOCYTES NFR BLD AUTO: 7 %
NEUTROPHILS # BLD AUTO: 3.4 X10E3/UL (ref 1.4–7)
NEUTROPHILS NFR BLD AUTO: 58 %
PLATELET # BLD AUTO: 206 X10E3/UL (ref 150–450)
RBC # BLD AUTO: 4.73 X10E6/UL (ref 4.14–5.8)
WBC # BLD AUTO: 5.8 X10E3/UL (ref 3.4–10.8)

## 2025-08-05 LAB
ALBUMIN SERPL-MCNC: 4.2 G/DL (ref 3.8–4.9)
ALP SERPL-CCNC: 83 IU/L (ref 44–121)
ALT SERPL-CCNC: 23 IU/L (ref 0–44)
AST SERPL-CCNC: 20 IU/L (ref 0–40)
BILIRUB SERPL-MCNC: 0.6 MG/DL (ref 0–1.2)
BUN SERPL-MCNC: 14 MG/DL (ref 6–24)
BUN/CREAT SERPL: 10 (ref 9–20)
CALCIUM SERPL-MCNC: 9.5 MG/DL (ref 8.7–10.2)
CHLORIDE SERPL-SCNC: 104 MMOL/L (ref 96–106)
CHOLEST SERPL-MCNC: 246 MG/DL (ref 100–199)
CO2 SERPL-SCNC: 24 MMOL/L (ref 20–29)
CREAT SERPL-MCNC: 1.35 MG/DL (ref 0.76–1.27)
EGFRCR SERPLBLD CKD-EPI 2021: 61 ML/MIN/1.73
GLOBULIN SER CALC-MCNC: 2.2 G/DL (ref 1.5–4.5)
GLUCOSE SERPL-MCNC: 83 MG/DL (ref 70–99)
HDLC SERPL-MCNC: 44 MG/DL
LDLC SERPL CALC-MCNC: 173 MG/DL (ref 0–99)
POTASSIUM SERPL-SCNC: 4.5 MMOL/L (ref 3.5–5.2)
PROT SERPL-MCNC: 6.4 G/DL (ref 6–8.5)
PSA SERPL-MCNC: 2.2 NG/ML (ref 0–4)
SODIUM SERPL-SCNC: 142 MMOL/L (ref 134–144)
T4 FREE SERPL-MCNC: 1.11 NG/DL (ref 0.82–1.77)
TRIGL SERPL-MCNC: 156 MG/DL (ref 0–149)
TSH SERPL DL<=0.005 MIU/L-ACNC: 1.36 UIU/ML (ref 0.45–4.5)
URATE SERPL-MCNC: 9.2 MG/DL (ref 3.8–8.4)
VLDLC SERPL CALC-MCNC: 29 MG/DL (ref 5–40)